# Patient Record
Sex: FEMALE | Race: WHITE | NOT HISPANIC OR LATINO | Employment: OTHER | URBAN - NONMETROPOLITAN AREA
[De-identification: names, ages, dates, MRNs, and addresses within clinical notes are randomized per-mention and may not be internally consistent; named-entity substitution may affect disease eponyms.]

---

## 2017-04-20 ENCOUNTER — TELEPHONE (OUTPATIENT)
Dept: GASTROENTEROLOGY | Age: 72
End: 2017-04-20

## 2017-05-05 ENCOUNTER — TELEPHONE (OUTPATIENT)
Dept: FAMILY MEDICINE | Age: 72
End: 2017-05-05

## 2017-05-16 ENCOUNTER — TELEPHONE (OUTPATIENT)
Dept: FAMILY MEDICINE | Age: 72
End: 2017-05-16

## 2017-05-17 ENCOUNTER — TELEPHONE (OUTPATIENT)
Dept: FAMILY MEDICINE | Age: 72
End: 2017-05-17

## 2017-05-18 ENCOUNTER — MED INFO FORMS (OUTPATIENT)
Dept: HEALTH INFORMATION MANAGEMENT | Age: 72
End: 2017-05-18

## 2017-05-22 RX ORDER — BISACODYL 5 MG/1
TABLET, DELAYED RELEASE ORAL
Qty: 4 TABLET | Refills: 0 | Status: ON HOLD | OUTPATIENT
Start: 2017-05-22 | End: 2017-06-06 | Stop reason: ALTCHOICE

## 2017-05-22 RX ORDER — POLYETHYLENE GLYCOL 3350, SODIUM CHLORIDE, SODIUM BICARBONATE, POTASSIUM CHLORIDE 420; 11.2; 5.72; 1.48 G/4L; G/4L; G/4L; G/4L
POWDER, FOR SOLUTION ORAL
Qty: 4000 ML | Refills: 0 | Status: ON HOLD | OUTPATIENT
Start: 2017-05-22 | End: 2017-06-06 | Stop reason: ALTCHOICE

## 2017-06-01 ENCOUNTER — MED INFO FORMS (OUTPATIENT)
Dept: HEALTH INFORMATION MANAGEMENT | Age: 72
End: 2017-06-01

## 2017-06-02 ASSESSMENT — ACTIVITIES OF DAILY LIVING (ADL)
CHRONIC_PAIN_PRESENT: NO
ADL_SHORT_OF_BREATH: NO
SENSORY_SUPPORT_DEVICES: EYEGLASSES;DENTURES
HISTORY OF FALLING IN THE LAST YEAR (PRIOR TO ADMISSION): NO
ADL_BEFORE_ADMISSION: INDEPENDENT
ADL_SCORE: 12
RECENT_DECLINE_ADL: NO
NEEDS_ASSIST: NO

## 2017-06-02 ASSESSMENT — COGNITIVE AND FUNCTIONAL STATUS - GENERAL
ARE YOU BLIND OR DO YOU HAVE SERIOUS DIFFICULTY SEEING, EVEN WHEN WEARING GLASSES: NO
ARE YOU DEAF OR DO YOU HAVE SERIOUS DIFFICULTY  HEARING: NO

## 2017-06-05 DIAGNOSIS — Z85.038 HISTORY OF COLON CANCER: Primary | ICD-10-CM

## 2017-06-05 RX ORDER — SODIUM CHLORIDE 9 MG/ML
INJECTION, SOLUTION INTRAVENOUS CONTINUOUS
Status: CANCELLED | OUTPATIENT
Start: 2017-06-05

## 2017-06-05 RX ORDER — 0.9 % SODIUM CHLORIDE 0.9 %
2 VIAL (ML) INJECTION EVERY 12 HOURS SCHEDULED
Status: CANCELLED | OUTPATIENT
Start: 2017-06-05

## 2017-06-05 RX ORDER — 0.9 % SODIUM CHLORIDE 0.9 %
2 VIAL (ML) INJECTION PRN
Status: CANCELLED | OUTPATIENT
Start: 2017-06-05

## 2017-06-06 ENCOUNTER — PREP FOR CASE (OUTPATIENT)
Dept: GASTROENTEROLOGY | Age: 72
End: 2017-06-06

## 2017-06-06 ENCOUNTER — SURGERY (OUTPATIENT)
Age: 72
End: 2017-06-06

## 2017-06-06 ENCOUNTER — HOSPITAL ENCOUNTER (OUTPATIENT)
Age: 72
Discharge: HOME | End: 2017-06-06
Attending: INTERNAL MEDICINE | Admitting: INTERNAL MEDICINE

## 2017-06-06 ENCOUNTER — TELEPHONE (OUTPATIENT)
Dept: GASTROENTEROLOGY | Age: 72
End: 2017-06-06

## 2017-06-06 VITALS
WEIGHT: 165 LBS | TEMPERATURE: 98.2 F | BODY MASS INDEX: 32.39 KG/M2 | HEIGHT: 60 IN | HEART RATE: 66 BPM | RESPIRATION RATE: 14 BRPM | OXYGEN SATURATION: 96 % | SYSTOLIC BLOOD PRESSURE: 149 MMHG | DIASTOLIC BLOOD PRESSURE: 58 MMHG

## 2017-06-06 DIAGNOSIS — Z85.038 HISTORY OF COLON CANCER: Primary | ICD-10-CM

## 2017-06-06 DIAGNOSIS — Z85.038 HISTORY OF COLON CANCER: ICD-10-CM

## 2017-06-06 DIAGNOSIS — Z93.3 COLOSTOMY IN PLACE (CMD): ICD-10-CM

## 2017-06-06 DIAGNOSIS — K57.30 DIVERTICULOSIS OF LARGE INTESTINE WITHOUT HEMORRHAGE: ICD-10-CM

## 2017-06-06 DIAGNOSIS — K63.5 POLYP OF DESCENDING COLON, UNSPECIFIED TYPE: ICD-10-CM

## 2017-06-06 PROCEDURE — 44394 COLONOSCOPY W/SNARE: CPT | Performed by: INTERNAL MEDICINE

## 2017-06-06 PROCEDURE — 10002800 HB RX 250 W HCPCS: Performed by: INTERNAL MEDICINE

## 2017-06-06 PROCEDURE — G0500 MOD SEDAT ENDO SERVICE >5YRS: HCPCS | Performed by: INTERNAL MEDICINE

## 2017-06-06 PROCEDURE — 10002807 HB RX 258: Performed by: INTERNAL MEDICINE

## 2017-06-06 PROCEDURE — 88305 TISSUE EXAM BY PATHOLOGIST: CPT

## 2017-06-06 PROCEDURE — 10003428 HB COLONOSCOPY: Performed by: INTERNAL MEDICINE

## 2017-06-06 PROCEDURE — 10003056 HB DISPOSABLE INSTRUMENT/SUPPLY 1: Performed by: INTERNAL MEDICINE

## 2017-06-06 RX ORDER — 0.9 % SODIUM CHLORIDE 0.9 %
2 VIAL (ML) INJECTION EVERY 12 HOURS SCHEDULED
Status: DISCONTINUED | OUTPATIENT
Start: 2017-06-06 | End: 2017-06-06 | Stop reason: HOSPADM

## 2017-06-06 RX ORDER — 0.9 % SODIUM CHLORIDE 0.9 %
2 VIAL (ML) INJECTION PRN
Status: DISCONTINUED | OUTPATIENT
Start: 2017-06-06 | End: 2017-06-06 | Stop reason: HOSPADM

## 2017-06-06 RX ORDER — SODIUM CHLORIDE 9 MG/ML
INJECTION, SOLUTION INTRAVENOUS CONTINUOUS
Status: DISCONTINUED | OUTPATIENT
Start: 2017-06-06 | End: 2017-06-06 | Stop reason: HOSPADM

## 2017-06-06 RX ORDER — MIDAZOLAM HYDROCHLORIDE 1 MG/ML
INJECTION, SOLUTION INTRAMUSCULAR; INTRAVENOUS PRN
Status: DISCONTINUED | OUTPATIENT
Start: 2017-06-06 | End: 2017-06-06 | Stop reason: HOSPADM

## 2017-06-06 RX ADMIN — SODIUM CHLORIDE: 9 INJECTION, SOLUTION INTRAVENOUS at 07:45

## 2017-06-06 RX ADMIN — FENTANYL CITRATE 50 MCG: 50 INJECTION INTRAMUSCULAR; INTRAVENOUS at 08:42

## 2017-06-06 RX ADMIN — MIDAZOLAM HYDROCHLORIDE 1 MG: 1 INJECTION, SOLUTION INTRAMUSCULAR; INTRAVENOUS at 08:43

## 2017-06-06 RX ADMIN — FENTANYL CITRATE 25 MCG: 50 INJECTION INTRAMUSCULAR; INTRAVENOUS at 08:46

## 2017-06-06 RX ADMIN — MIDAZOLAM HYDROCHLORIDE 1 MG: 1 INJECTION, SOLUTION INTRAMUSCULAR; INTRAVENOUS at 08:55

## 2017-06-06 RX ADMIN — MIDAZOLAM HYDROCHLORIDE 2 MG: 1 INJECTION, SOLUTION INTRAMUSCULAR; INTRAVENOUS at 08:42

## 2017-06-06 RX ADMIN — MIDAZOLAM HYDROCHLORIDE 1 MG: 1 INJECTION, SOLUTION INTRAMUSCULAR; INTRAVENOUS at 08:46

## 2017-06-06 RX ADMIN — FENTANYL CITRATE 25 MCG: 50 INJECTION INTRAMUSCULAR; INTRAVENOUS at 08:54

## 2017-06-06 ASSESSMENT — PAIN SCALES - GENERAL
PAIN_LEVEL_AT_REST: 0

## 2017-06-06 ASSESSMENT — LIFESTYLE VARIABLES: SMOKING_HISTORY: NO

## 2017-06-07 LAB — PATHOLOGIST NAME: NORMAL

## 2017-06-15 ENCOUNTER — TELEPHONE (OUTPATIENT)
Dept: GASTROENTEROLOGY | Age: 72
End: 2017-06-15

## 2017-07-31 ENCOUNTER — OFFICE VISIT (OUTPATIENT)
Dept: FAMILY MEDICINE | Age: 72
End: 2017-07-31

## 2017-07-31 VITALS
DIASTOLIC BLOOD PRESSURE: 68 MMHG | RESPIRATION RATE: 14 BRPM | HEIGHT: 60 IN | WEIGHT: 177 LBS | BODY MASS INDEX: 34.75 KG/M2 | SYSTOLIC BLOOD PRESSURE: 142 MMHG

## 2017-07-31 DIAGNOSIS — L91.8 SKIN TAG: ICD-10-CM

## 2017-07-31 DIAGNOSIS — L98.9 SKIN LESION OF SCALP: Primary | ICD-10-CM

## 2017-07-31 PROCEDURE — 11200 RMVL SKIN TAGS UP TO&INC 15: CPT | Performed by: FAMILY MEDICINE

## 2017-07-31 PROCEDURE — 11100 BIOPSY OF SKIN LESION: CPT | Performed by: FAMILY MEDICINE

## 2017-08-02 ENCOUNTER — NURSE ONLY (OUTPATIENT)
Dept: FAMILY MEDICINE | Age: 72
End: 2017-08-02

## 2017-08-02 VITALS
RESPIRATION RATE: 16 BRPM | DIASTOLIC BLOOD PRESSURE: 66 MMHG | HEART RATE: 60 BPM | SYSTOLIC BLOOD PRESSURE: 160 MMHG | OXYGEN SATURATION: 97 %

## 2017-08-02 DIAGNOSIS — Z51.81 MEDICATION MONITORING ENCOUNTER: Primary | ICD-10-CM

## 2017-08-02 LAB — PATHOLOGIST NAME: NORMAL

## 2017-08-02 RX ORDER — HYDROCHLOROTHIAZIDE 12.5 MG/1
12.5 CAPSULE, GELATIN COATED ORAL DAILY
Qty: 30 CAPSULE | Refills: 1 | Status: SHIPPED | OUTPATIENT
Start: 2017-08-02 | End: 2017-09-07 | Stop reason: SDUPTHER

## 2017-08-03 ENCOUNTER — TELEPHONE (OUTPATIENT)
Dept: FAMILY MEDICINE | Age: 72
End: 2017-08-03

## 2017-08-03 DIAGNOSIS — C44.92 SCC (SQUAMOUS CELL CARCINOMA): Primary | ICD-10-CM

## 2017-08-09 ENCOUNTER — OFFICE VISIT (OUTPATIENT)
Dept: DERMATOLOGY | Age: 72
End: 2017-08-09

## 2017-08-09 VITALS — DIASTOLIC BLOOD PRESSURE: 82 MMHG | SYSTOLIC BLOOD PRESSURE: 140 MMHG | HEART RATE: 64 BPM | RESPIRATION RATE: 16 BRPM

## 2017-08-09 DIAGNOSIS — C44.42 SCC (SQUAMOUS CELL CARCINOMA), SCALP/NECK: Primary | ICD-10-CM

## 2017-08-09 PROCEDURE — 17312 MOHS ADDL STAGE: CPT | Performed by: DERMATOLOGY

## 2017-08-09 PROCEDURE — 17311 MOHS 1 STAGE H/N/HF/G: CPT | Performed by: DERMATOLOGY

## 2017-09-06 ENCOUNTER — OFFICE VISIT (OUTPATIENT)
Dept: DERMATOLOGY | Age: 72
End: 2017-09-06

## 2017-09-06 VITALS — HEART RATE: 82 BPM | DIASTOLIC BLOOD PRESSURE: 82 MMHG | RESPIRATION RATE: 18 BRPM | SYSTOLIC BLOOD PRESSURE: 128 MMHG

## 2017-09-06 DIAGNOSIS — C44.42 SCC (SQUAMOUS CELL CARCINOMA), SCALP/NECK: Primary | ICD-10-CM

## 2017-09-06 PROCEDURE — 99212 OFFICE O/P EST SF 10 MIN: CPT | Performed by: DERMATOLOGY

## 2017-09-07 ENCOUNTER — TELEPHONE (OUTPATIENT)
Dept: FAMILY MEDICINE | Age: 72
End: 2017-09-07

## 2017-09-07 RX ORDER — HYDROCHLOROTHIAZIDE 12.5 MG/1
12.5 CAPSULE, GELATIN COATED ORAL DAILY
Qty: 30 CAPSULE | Refills: 6 | Status: SHIPPED | OUTPATIENT
Start: 2017-09-07 | End: 2018-02-22 | Stop reason: SDUPTHER

## 2017-10-19 ENCOUNTER — TELEPHONE (OUTPATIENT)
Dept: FAMILY MEDICINE | Age: 72
End: 2017-10-19

## 2017-10-19 DIAGNOSIS — Z93.3 COLOSTOMY IN PLACE (CMD): Primary | ICD-10-CM

## 2017-12-07 ENCOUNTER — LAB SERVICES (OUTPATIENT)
Dept: LAB | Age: 72
End: 2017-12-07

## 2017-12-07 ENCOUNTER — IMAGING SERVICES (OUTPATIENT)
Dept: GENERAL RADIOLOGY | Age: 72
End: 2017-12-07
Attending: FAMILY MEDICINE

## 2017-12-07 ENCOUNTER — TELEPHONE (OUTPATIENT)
Dept: FAMILY MEDICINE | Age: 72
End: 2017-12-07

## 2017-12-07 ENCOUNTER — OFFICE VISIT (OUTPATIENT)
Dept: FAMILY MEDICINE | Age: 72
End: 2017-12-07

## 2017-12-07 VITALS
SYSTOLIC BLOOD PRESSURE: 132 MMHG | WEIGHT: 175.9 LBS | BODY MASS INDEX: 33.21 KG/M2 | RESPIRATION RATE: 16 BRPM | HEIGHT: 61 IN | HEART RATE: 64 BPM | DIASTOLIC BLOOD PRESSURE: 68 MMHG

## 2017-12-07 DIAGNOSIS — Z85.828 HISTORY OF MOHS SURGERY FOR SQUAMOUS CELL CARCINOMA OF SKIN: ICD-10-CM

## 2017-12-07 DIAGNOSIS — Z11.59 NEED FOR HEPATITIS C SCREENING TEST: ICD-10-CM

## 2017-12-07 DIAGNOSIS — M54.9 OTHER ACUTE BACK PAIN: ICD-10-CM

## 2017-12-07 DIAGNOSIS — R93.1 AGATSTON CORONARY ARTERY CALCIUM SCORE LESS THAN 100: ICD-10-CM

## 2017-12-07 DIAGNOSIS — K58.0 IRRITABLE BOWEL SYNDROME WITH DIARRHEA: ICD-10-CM

## 2017-12-07 DIAGNOSIS — K21.9 GASTROESOPHAGEAL REFLUX DISEASE, ESOPHAGITIS PRESENCE NOT SPECIFIED: ICD-10-CM

## 2017-12-07 DIAGNOSIS — E78.49 OTHER HYPERLIPIDEMIA: ICD-10-CM

## 2017-12-07 DIAGNOSIS — Z85.038 HISTORY OF COLON CANCER: ICD-10-CM

## 2017-12-07 DIAGNOSIS — Z00.00 MEDICARE ANNUAL WELLNESS VISIT, SUBSEQUENT: Primary | ICD-10-CM

## 2017-12-07 DIAGNOSIS — I10 ESSENTIAL HYPERTENSION: ICD-10-CM

## 2017-12-07 DIAGNOSIS — Z98.890 HISTORY OF MOHS SURGERY FOR SQUAMOUS CELL CARCINOMA OF SKIN: ICD-10-CM

## 2017-12-07 DIAGNOSIS — Z79.899 MEDICATION MANAGEMENT: ICD-10-CM

## 2017-12-07 DIAGNOSIS — Z86.59 HISTORY OF DEPRESSION: ICD-10-CM

## 2017-12-07 DIAGNOSIS — M51.36 DEGENERATIVE DISC DISEASE, LUMBAR: ICD-10-CM

## 2017-12-07 DIAGNOSIS — Z23 NEED FOR VACCINATION: ICD-10-CM

## 2017-12-07 DIAGNOSIS — R82.90 ABNORMAL URINE: ICD-10-CM

## 2017-12-07 DIAGNOSIS — M47.816 LUMBAR SPONDYLOSIS: ICD-10-CM

## 2017-12-07 DIAGNOSIS — Z93.3 COLOSTOMY IN PLACE (CMD): ICD-10-CM

## 2017-12-07 DIAGNOSIS — H43.392 VITREOUS FLOATERS OF LEFT EYE: ICD-10-CM

## 2017-12-07 LAB
ALBUMIN SERPL-MCNC: 3.5 G/DL (ref 3.6–5.1)
ALBUMIN/GLOB SERPL: 1.1 {RATIO} (ref 1–2.4)
ALP SERPL-CCNC: 74 UNITS/L (ref 45–117)
ALT SERPL-CCNC: 28 UNITS/L
ANION GAP SERPL CALC-SCNC: 14 MMOL/L (ref 10–20)
APPEARANCE UR: CLEAR
APPEARANCE UR: CLEAR
AST SERPL-CCNC: 16 UNITS/L
BACTERIA #/AREA URNS HPF: ABNORMAL /HPF
BILIRUB SERPL-MCNC: 0.5 MG/DL (ref 0.2–1)
BILIRUB UR QL STRIP: NEGATIVE
BILIRUB UR QL STRIP: NEGATIVE
BUN SERPL-MCNC: 22 MG/DL (ref 6–20)
BUN/CREAT SERPL: 26 (ref 7–25)
CALCIUM SERPL-MCNC: 9 MG/DL (ref 8.4–10.2)
CHLORIDE SERPL-SCNC: 101 MMOL/L (ref 98–107)
CHOLEST SERPL-MCNC: 283 MG/DL
CHOLEST/HDLC SERPL: 3.6 {RATIO}
CO2 SERPL-SCNC: 27 MMOL/L (ref 21–32)
COLOR UR: YELLOW
COLOR UR: YELLOW
CREAT SERPL-MCNC: 0.84 MG/DL (ref 0.51–0.95)
FASTING STATUS PATIENT QL REPORTED: 12 HRS
GLOBULIN SER-MCNC: 3.2 G/DL (ref 2–4)
GLUCOSE SERPL-MCNC: 91 MG/DL (ref 65–99)
GLUCOSE UR STRIP-MCNC: NEGATIVE MG/DL
GLUCOSE UR STRIP-MCNC: NEGATIVE MG/DL
HCV AB SER QL: NEGATIVE
HDLC SERPL-MCNC: 79 MG/DL
HGB UR QL STRIP: NEGATIVE
HGB UR QL STRIP: NEGATIVE
HYALINE CASTS #/AREA URNS LPF: ABNORMAL /LPF (ref 0–5)
KETONES UR STRIP-MCNC: NEGATIVE MG/DL
KETONES UR STRIP-MCNC: NEGATIVE MG/DL
LDLC SERPL-MCNC: 173 MG/DL
LENGTH OF FAST TIME PATIENT: 12 HRS
LEUKOCYTE ESTERASE UR QL STRIP: ABNORMAL
LEUKOCYTE ESTERASE UR QL STRIP: ABNORMAL
NITRITE UR QL STRIP: NEGATIVE
NITRITE UR QL STRIP: NEGATIVE
NONHDLC SERPL-MCNC: 204 MG/DL
PH UR STRIP: 7 UNITS (ref 5–7)
PH UR STRIP: 7 UNITS (ref 5–7)
POTASSIUM SERPL-SCNC: 3.9 MMOL/L (ref 3.4–5.1)
PROT SERPL-MCNC: 6.7 G/DL (ref 6.4–8.2)
PROT UR STRIP-MCNC: NEGATIVE MG/DL
PROT UR STRIP-MCNC: NEGATIVE MG/DL
RBC #/AREA URNS HPF: ABNORMAL /HPF (ref 0–3)
SODIUM SERPL-SCNC: 138 MMOL/L (ref 135–145)
SP GR UR STRIP: 1.01 (ref 1–1.03)
SP GR UR STRIP: 1.01 (ref 1–1.03)
SPECIMEN SOURCE: ABNORMAL
SPECIMEN SOURCE: ABNORMAL
SQUAMOUS #/AREA URNS HPF: ABNORMAL /HPF (ref 0–5)
TRIGL SERPL-MCNC: 156 MG/DL
UROBILINOGEN UR STRIP-MCNC: 0.2 MG/DL (ref 0–1)
UROBILINOGEN UR STRIP-MCNC: 0.2 MG/DL (ref 0–1)
WBC #/AREA URNS HPF: ABNORMAL /HPF (ref 0–5)

## 2017-12-07 PROCEDURE — 72100 X-RAY EXAM L-S SPINE 2/3 VWS: CPT | Performed by: RADIOLOGY

## 2017-12-07 PROCEDURE — G0008 ADMIN INFLUENZA VIRUS VAC: HCPCS | Performed by: FAMILY MEDICINE

## 2017-12-07 PROCEDURE — 36415 COLL VENOUS BLD VENIPUNCTURE: CPT | Performed by: INTERNAL MEDICINE

## 2017-12-07 PROCEDURE — G0439 PPPS, SUBSEQ VISIT: HCPCS | Performed by: FAMILY MEDICINE

## 2017-12-07 PROCEDURE — 81003 URINALYSIS AUTO W/O SCOPE: CPT | Performed by: INTERNAL MEDICINE

## 2017-12-07 PROCEDURE — 99214 OFFICE O/P EST MOD 30 MIN: CPT | Performed by: FAMILY MEDICINE

## 2017-12-07 PROCEDURE — 90662 IIV NO PRSV INCREASED AG IM: CPT | Performed by: FAMILY MEDICINE

## 2017-12-07 RX ORDER — OMEPRAZOLE 20 MG/1
20 CAPSULE, DELAYED RELEASE ORAL DAILY
Qty: 90 CAPSULE | Refills: 3 | Status: SHIPPED | OUTPATIENT
Start: 2017-12-07 | End: 2018-02-19 | Stop reason: SDUPTHER

## 2017-12-07 RX ORDER — LOPERAMIDE HYDROCHLORIDE 2 MG/1
2 TABLET ORAL PRN
COMMUNITY
End: 2021-11-23 | Stop reason: ALTCHOICE

## 2017-12-07 RX ORDER — LOSARTAN POTASSIUM 100 MG/1
100 TABLET ORAL DAILY
Qty: 90 TABLET | Refills: 3 | Status: SHIPPED | OUTPATIENT
Start: 2017-12-07 | End: 2017-12-07 | Stop reason: SDUPTHER

## 2017-12-07 RX ORDER — DICYCLOMINE HCL 20 MG
TABLET ORAL
Qty: 30 TABLET | Refills: 0 | Status: SHIPPED | OUTPATIENT
Start: 2017-12-07 | End: 2020-09-17 | Stop reason: ALTCHOICE

## 2017-12-07 RX ORDER — LOSARTAN POTASSIUM 100 MG/1
100 TABLET ORAL DAILY
Qty: 90 TABLET | Refills: 3 | Status: SHIPPED | OUTPATIENT
Start: 2017-12-07 | End: 2018-02-22 | Stop reason: SDUPTHER

## 2017-12-07 ASSESSMENT — PATIENT HEALTH QUESTIONNAIRE - PHQ9
1. LITTLE INTEREST OR PLEASURE IN DOING THINGS: NO
2. FEELING DOWN, DEPRESSED OR HOPELESS: NO

## 2017-12-09 PROBLEM — Z98.890 HISTORY OF MOHS SURGERY FOR SQUAMOUS CELL CARCINOMA OF SKIN: Status: RESOLVED | Noted: 2017-12-09 | Resolved: 2017-12-09

## 2017-12-09 PROBLEM — M51.36 DEGENERATIVE DISC DISEASE, LUMBAR: Status: ACTIVE | Noted: 2017-12-09

## 2017-12-09 PROBLEM — H43.392 VITREOUS FLOATERS OF LEFT EYE: Status: ACTIVE | Noted: 2017-12-09

## 2017-12-09 PROBLEM — Z98.890 HISTORY OF MOHS SURGERY FOR SQUAMOUS CELL CARCINOMA OF SKIN: Status: ACTIVE | Noted: 2017-12-09

## 2017-12-09 PROBLEM — K58.0 IRRITABLE BOWEL SYNDROME WITH DIARRHEA: Status: ACTIVE | Noted: 2017-12-09

## 2017-12-09 PROBLEM — M51.369 DEGENERATIVE DISC DISEASE, LUMBAR: Status: ACTIVE | Noted: 2017-12-09

## 2017-12-09 PROBLEM — M47.816 LUMBAR SPONDYLOSIS: Status: ACTIVE | Noted: 2017-12-09

## 2017-12-09 PROBLEM — Z85.828 HISTORY OF MOHS SURGERY FOR SQUAMOUS CELL CARCINOMA OF SKIN: Status: ACTIVE | Noted: 2017-12-09

## 2017-12-09 PROBLEM — Z85.828 HISTORY OF MOHS SURGERY FOR SQUAMOUS CELL CARCINOMA OF SKIN: Status: RESOLVED | Noted: 2017-12-09 | Resolved: 2017-12-09

## 2018-01-08 ENCOUNTER — TELEPHONE (OUTPATIENT)
Dept: FAMILY MEDICINE | Age: 73
End: 2018-01-08

## 2018-01-09 ENCOUNTER — TELEPHONE (OUTPATIENT)
Dept: FAMILY MEDICINE | Age: 73
End: 2018-01-09

## 2018-01-09 DIAGNOSIS — M54.50 BILATERAL LOW BACK PAIN WITHOUT SCIATICA, UNSPECIFIED CHRONICITY: Primary | ICD-10-CM

## 2018-01-26 ENCOUNTER — TELEPHONE (OUTPATIENT)
Dept: FAMILY MEDICINE | Age: 73
End: 2018-01-26

## 2018-02-19 RX ORDER — HYDROCHLOROTHIAZIDE 12.5 MG/1
CAPSULE, GELATIN COATED ORAL
Qty: 90 CAPSULE | Refills: 6 | OUTPATIENT
Start: 2018-02-19

## 2018-02-19 RX ORDER — OMEPRAZOLE 20 MG/1
20 CAPSULE, DELAYED RELEASE ORAL DAILY
Qty: 90 CAPSULE | Refills: 3 | Status: SHIPPED | OUTPATIENT
Start: 2018-02-19 | End: 2018-02-22 | Stop reason: SDUPTHER

## 2018-02-21 ENCOUNTER — TELEPHONE (OUTPATIENT)
Dept: FAMILY MEDICINE | Age: 73
End: 2018-02-21

## 2018-02-22 RX ORDER — HYDROCHLOROTHIAZIDE 12.5 MG/1
12.5 CAPSULE, GELATIN COATED ORAL DAILY
Qty: 30 CAPSULE | Refills: 9 | Status: SHIPPED | OUTPATIENT
Start: 2018-02-22 | End: 2018-11-08 | Stop reason: SDUPTHER

## 2018-02-22 RX ORDER — LOSARTAN POTASSIUM 100 MG/1
100 TABLET ORAL DAILY
Qty: 90 TABLET | Refills: 2 | Status: SHIPPED | OUTPATIENT
Start: 2018-02-22 | End: 2018-11-08 | Stop reason: SDUPTHER

## 2018-02-22 RX ORDER — OMEPRAZOLE 20 MG/1
20 CAPSULE, DELAYED RELEASE ORAL DAILY
Qty: 90 CAPSULE | Refills: 2 | Status: SHIPPED | OUTPATIENT
Start: 2018-02-22 | End: 2018-11-08 | Stop reason: SDUPTHER

## 2018-04-18 ENCOUNTER — TELEPHONE (OUTPATIENT)
Dept: FAMILY MEDICINE | Age: 73
End: 2018-04-18

## 2018-04-23 ENCOUNTER — TELEPHONE (OUTPATIENT)
Dept: FAMILY MEDICINE | Age: 73
End: 2018-04-23

## 2018-04-23 ENCOUNTER — OFFICE VISIT (OUTPATIENT)
Dept: DERMATOLOGY | Age: 73
End: 2018-04-23

## 2018-04-23 VITALS — HEART RATE: 82 BPM | SYSTOLIC BLOOD PRESSURE: 120 MMHG | DIASTOLIC BLOOD PRESSURE: 76 MMHG | RESPIRATION RATE: 20 BRPM

## 2018-04-23 DIAGNOSIS — Z85.828 PERSONAL HISTORY OF OTHER MALIGNANT NEOPLASM OF SKIN: ICD-10-CM

## 2018-04-23 DIAGNOSIS — D48.5 NEOPLASM OF UNCERTAIN BEHAVIOR OF SKIN: Primary | ICD-10-CM

## 2018-04-23 DIAGNOSIS — L82.1 OTHER SEBORRHEIC KERATOSIS: ICD-10-CM

## 2018-04-23 PROCEDURE — 99213 OFFICE O/P EST LOW 20 MIN: CPT | Performed by: DERMATOLOGY

## 2018-04-23 PROCEDURE — 11310 SHAVE SKIN LESION 0.5 CM/<: CPT | Performed by: DERMATOLOGY

## 2018-04-25 ENCOUNTER — TELEPHONE (OUTPATIENT)
Dept: DERMATOLOGY | Age: 73
End: 2018-04-25

## 2018-04-25 ENCOUNTER — OFF PREMISE (OUTPATIENT)
Dept: OTHER | Age: 73
End: 2018-04-25

## 2018-04-25 LAB — PATHOLOGIST NAME: NORMAL

## 2018-05-31 ENCOUNTER — TELEPHONE (OUTPATIENT)
Dept: FAMILY MEDICINE | Age: 73
End: 2018-05-31

## 2018-05-31 DIAGNOSIS — Z12.39 SCREENING BREAST EXAMINATION: Primary | ICD-10-CM

## 2018-06-07 ENCOUNTER — MED INFO FORMS (OUTPATIENT)
Dept: OTHER | Age: 73
End: 2018-06-07

## 2018-06-13 ENCOUNTER — HOSPITAL ENCOUNTER (OUTPATIENT)
Dept: MAMMOGRAPHY | Age: 73
Discharge: HOME | End: 2018-06-13
Attending: FAMILY MEDICINE

## 2018-06-13 DIAGNOSIS — Z12.39 SCREENING BREAST EXAMINATION: ICD-10-CM

## 2018-06-13 PROCEDURE — 3342F MAMMO ASSESS BENGN DOCD: CPT | Performed by: RADIOLOGY

## 2018-06-13 PROCEDURE — 77063 BREAST TOMOSYNTHESIS BI: CPT | Performed by: RADIOLOGY

## 2018-06-13 PROCEDURE — 77063 BREAST TOMOSYNTHESIS BI: CPT

## 2018-06-13 PROCEDURE — 77067 SCR MAMMO BI INCL CAD: CPT | Performed by: RADIOLOGY

## 2018-06-22 ENCOUNTER — OFFICE VISIT (OUTPATIENT)
Dept: FAMILY MEDICINE | Age: 73
End: 2018-06-22

## 2018-06-22 ENCOUNTER — IMAGING SERVICES (OUTPATIENT)
Dept: GENERAL RADIOLOGY | Age: 73
End: 2018-06-22
Attending: FAMILY MEDICINE

## 2018-06-22 ENCOUNTER — TELEPHONE (OUTPATIENT)
Dept: FAMILY MEDICINE | Age: 73
End: 2018-06-22

## 2018-06-22 DIAGNOSIS — R05.9 COUGH: ICD-10-CM

## 2018-06-22 DIAGNOSIS — M79.10 MYALGIA: ICD-10-CM

## 2018-06-22 DIAGNOSIS — J06.9 VIRAL URI WITH COUGH: Primary | ICD-10-CM

## 2018-06-22 PROCEDURE — 71046 X-RAY EXAM CHEST 2 VIEWS: CPT | Performed by: RADIOLOGY

## 2018-06-22 PROCEDURE — 99213 OFFICE O/P EST LOW 20 MIN: CPT | Performed by: FAMILY MEDICINE

## 2018-06-23 VITALS
TEMPERATURE: 98.3 F | OXYGEN SATURATION: 97 % | SYSTOLIC BLOOD PRESSURE: 128 MMHG | RESPIRATION RATE: 16 BRPM | DIASTOLIC BLOOD PRESSURE: 80 MMHG | HEART RATE: 72 BPM | WEIGHT: 175.8 LBS | HEIGHT: 61 IN | BODY MASS INDEX: 33.19 KG/M2

## 2018-06-23 RX ORDER — PROMETHAZINE HYDROCHLORIDE AND CODEINE PHOSPHATE 6.25; 1 MG/5ML; MG/5ML
SYRUP ORAL
Qty: 120 ML | Refills: 0 | Status: SHIPPED
Start: 2018-06-23 | End: 2019-09-14 | Stop reason: ALTCHOICE

## 2018-08-13 ENCOUNTER — TELEPHONE (OUTPATIENT)
Dept: FAMILY MEDICINE | Age: 73
End: 2018-08-13

## 2018-08-13 ENCOUNTER — LAB SERVICES (OUTPATIENT)
Dept: LAB | Age: 73
End: 2018-08-13

## 2018-08-13 DIAGNOSIS — N34.3 DYSURIA-FREQUENCY SYNDROME: ICD-10-CM

## 2018-08-13 DIAGNOSIS — N39.0 ACUTE UTI: ICD-10-CM

## 2018-08-13 DIAGNOSIS — N34.3 DYSURIA-FREQUENCY SYNDROME: Primary | ICD-10-CM

## 2018-08-13 LAB
APPEARANCE UR: CLEAR
BILIRUB UR QL STRIP: NEGATIVE
COLOR UR: YELLOW
GLUCOSE UR STRIP-MCNC: NEGATIVE MG/DL
HGB UR QL STRIP: ABNORMAL
KETONES UR STRIP-MCNC: NEGATIVE MG/DL
LEUKOCYTE ESTERASE UR QL STRIP: ABNORMAL
NITRITE UR QL STRIP: NEGATIVE
PH UR STRIP: 6 UNITS (ref 5–7)
PROT UR STRIP-MCNC: NEGATIVE MG/DL
SP GR UR STRIP: 1.01 (ref 1–1.03)
SPECIMEN SOURCE: ABNORMAL
UROBILINOGEN UR STRIP-MCNC: 0.2 MG/DL (ref 0–1)

## 2018-08-13 PROCEDURE — 81003 URINALYSIS AUTO W/O SCOPE: CPT | Performed by: INTERNAL MEDICINE

## 2018-08-13 RX ORDER — SULFAMETHOXAZOLE AND TRIMETHOPRIM 800; 160 MG/1; MG/1
1 TABLET ORAL 2 TIMES DAILY
Qty: 6 TABLET | Refills: 0 | Status: SHIPPED | OUTPATIENT
Start: 2018-08-13 | End: 2018-08-16

## 2018-08-15 LAB
BACTERIA UR CULT: ABNORMAL
ORGANISM: ABNORMAL
REPORT STATUS (RPT): ABNORMAL
SPECIMEN SOURCE: ABNORMAL

## 2018-08-17 ENCOUNTER — TELEPHONE (OUTPATIENT)
Dept: FAMILY MEDICINE | Age: 73
End: 2018-08-17

## 2018-11-08 ENCOUNTER — TELEPHONE (OUTPATIENT)
Dept: FAMILY MEDICINE | Age: 73
End: 2018-11-08

## 2018-11-08 RX ORDER — OMEPRAZOLE 20 MG/1
20 CAPSULE, DELAYED RELEASE ORAL DAILY
Qty: 90 CAPSULE | Refills: 2 | Status: SHIPPED | OUTPATIENT
Start: 2018-11-08 | End: 2019-09-12 | Stop reason: SDUPTHER

## 2018-11-08 RX ORDER — LOSARTAN POTASSIUM 100 MG/1
100 TABLET ORAL DAILY
Qty: 90 TABLET | Refills: 2 | Status: SHIPPED | OUTPATIENT
Start: 2018-11-08 | End: 2019-09-12 | Stop reason: SDUPTHER

## 2018-11-08 RX ORDER — HYDROCHLOROTHIAZIDE 12.5 MG/1
12.5 CAPSULE, GELATIN COATED ORAL DAILY
Qty: 90 CAPSULE | Refills: 2 | Status: SHIPPED | OUTPATIENT
Start: 2018-11-08 | End: 2019-08-31 | Stop reason: SDUPTHER

## 2018-12-21 ENCOUNTER — TELEPHONE (OUTPATIENT)
Dept: FAMILY MEDICINE | Age: 73
End: 2018-12-21

## 2018-12-28 ENCOUNTER — TELEPHONE (OUTPATIENT)
Dept: FAMILY MEDICINE | Age: 73
End: 2018-12-28

## 2019-01-28 ENCOUNTER — TELEPHONE (OUTPATIENT)
Dept: FAMILY MEDICINE | Age: 74
End: 2019-01-28

## 2019-02-12 ENCOUNTER — TELEPHONE (OUTPATIENT)
Dept: FAMILY MEDICINE | Age: 74
End: 2019-02-12

## 2019-02-12 RX ORDER — LORAZEPAM 0.5 MG/1
0.5 TABLET ORAL AT BEDTIME
Qty: 30 TABLET | Refills: 0 | Status: SHIPPED | OUTPATIENT
Start: 2019-02-12 | End: 2019-09-14 | Stop reason: CLARIF

## 2019-03-25 ENCOUNTER — TELEPHONE (OUTPATIENT)
Dept: GASTROENTEROLOGY | Age: 74
End: 2019-03-25

## 2019-07-02 ENCOUNTER — OFFICE VISIT (OUTPATIENT)
Dept: FAMILY MEDICINE | Age: 74
End: 2019-07-02

## 2019-07-02 VITALS
OXYGEN SATURATION: 97 % | DIASTOLIC BLOOD PRESSURE: 82 MMHG | HEIGHT: 61 IN | WEIGHT: 173.7 LBS | HEART RATE: 61 BPM | BODY MASS INDEX: 32.8 KG/M2 | RESPIRATION RATE: 18 BRPM | SYSTOLIC BLOOD PRESSURE: 144 MMHG | TEMPERATURE: 95.9 F

## 2019-07-02 DIAGNOSIS — L72.3 INFECTED SEBACEOUS CYST: ICD-10-CM

## 2019-07-02 DIAGNOSIS — N61.1 BOIL, BREAST: Primary | ICD-10-CM

## 2019-07-02 DIAGNOSIS — L08.9 INFECTED SEBACEOUS CYST: ICD-10-CM

## 2019-07-02 PROCEDURE — 99213 OFFICE O/P EST LOW 20 MIN: CPT | Performed by: FAMILY MEDICINE

## 2019-07-02 PROCEDURE — 10060 I&D ABSCESS SIMPLE/SINGLE: CPT | Performed by: FAMILY MEDICINE

## 2019-07-02 RX ORDER — SULFAMETHOXAZOLE AND TRIMETHOPRIM 800; 160 MG/1; MG/1
1 TABLET ORAL 2 TIMES DAILY
Qty: 14 TABLET | Refills: 0 | Status: SHIPPED | OUTPATIENT
Start: 2019-07-02 | End: 2019-07-09

## 2019-07-02 ASSESSMENT — ENCOUNTER SYMPTOMS
FEVER: 0
EYES NEGATIVE: 1
HEMATOLOGIC/LYMPHATIC NEGATIVE: 1
NEUROLOGICAL NEGATIVE: 1
FATIGUE: 0
ACTIVITY CHANGE: 0
GASTROINTESTINAL NEGATIVE: 1
PSYCHIATRIC NEGATIVE: 1
CHILLS: 0
RESPIRATORY NEGATIVE: 1
DIAPHORESIS: 0
ENDOCRINE NEGATIVE: 1
CONSTITUTIONAL NEGATIVE: 1
ALLERGIC/IMMUNOLOGIC NEGATIVE: 1

## 2019-07-02 ASSESSMENT — PATIENT HEALTH QUESTIONNAIRE - PHQ9
1. LITTLE INTEREST OR PLEASURE IN DOING THINGS: NOT AT ALL
SUM OF ALL RESPONSES TO PHQ9 QUESTIONS 1 AND 2: 0
2. FEELING DOWN, DEPRESSED OR HOPELESS: NOT AT ALL
SUM OF ALL RESPONSES TO PHQ9 QUESTIONS 1 AND 2: 0

## 2019-09-03 RX ORDER — HYDROCHLOROTHIAZIDE 12.5 MG/1
12.5 CAPSULE, GELATIN COATED ORAL DAILY
Qty: 90 CAPSULE | Refills: 0 | Status: SHIPPED | OUTPATIENT
Start: 2019-09-03 | End: 2019-09-12 | Stop reason: SDUPTHER

## 2019-09-12 ENCOUNTER — TELEPHONE (OUTPATIENT)
Dept: GASTROENTEROLOGY | Age: 74
End: 2019-09-12

## 2019-09-12 ENCOUNTER — OFFICE VISIT (OUTPATIENT)
Dept: FAMILY MEDICINE | Age: 74
End: 2019-09-12

## 2019-09-12 DIAGNOSIS — M54.50 BILATERAL LOW BACK PAIN WITHOUT SCIATICA, UNSPECIFIED CHRONICITY: ICD-10-CM

## 2019-09-12 DIAGNOSIS — J02.9 SORE THROAT: ICD-10-CM

## 2019-09-12 DIAGNOSIS — I10 ESSENTIAL HYPERTENSION: ICD-10-CM

## 2019-09-12 DIAGNOSIS — K58.0 IRRITABLE BOWEL SYNDROME WITH DIARRHEA: ICD-10-CM

## 2019-09-12 DIAGNOSIS — K21.9 GASTROESOPHAGEAL REFLUX DISEASE, ESOPHAGITIS PRESENCE NOT SPECIFIED: ICD-10-CM

## 2019-09-12 DIAGNOSIS — Z93.3 COLOSTOMY IN PLACE (CMD): ICD-10-CM

## 2019-09-12 DIAGNOSIS — Z86.59 HISTORY OF DEPRESSION: ICD-10-CM

## 2019-09-12 DIAGNOSIS — M51.36 DEGENERATIVE DISC DISEASE, LUMBAR: ICD-10-CM

## 2019-09-12 DIAGNOSIS — M47.816 LUMBAR SPONDYLOSIS: ICD-10-CM

## 2019-09-12 DIAGNOSIS — Z85.038 HISTORY OF COLON CANCER: ICD-10-CM

## 2019-09-12 DIAGNOSIS — Z79.899 MEDICATION MANAGEMENT: ICD-10-CM

## 2019-09-12 DIAGNOSIS — H35.372 MACULAR PUCKER, LEFT EYE: ICD-10-CM

## 2019-09-12 DIAGNOSIS — E78.49 OTHER HYPERLIPIDEMIA: ICD-10-CM

## 2019-09-12 DIAGNOSIS — Z98.890 STATUS POST COLONOSCOPY: ICD-10-CM

## 2019-09-12 DIAGNOSIS — Z78.0 POST-MENOPAUSAL: ICD-10-CM

## 2019-09-12 DIAGNOSIS — Z00.00 MEDICARE ANNUAL WELLNESS VISIT, SUBSEQUENT: Primary | ICD-10-CM

## 2019-09-12 DIAGNOSIS — R93.1 AGATSTON CORONARY ARTERY CALCIUM SCORE LESS THAN 100: ICD-10-CM

## 2019-09-12 DIAGNOSIS — H54.62 VISION LOSS OF LEFT EYE: ICD-10-CM

## 2019-09-12 LAB
APPEARANCE UR: CLEAR
BILIRUB UR QL STRIP: NEGATIVE
COLOR UR: YELLOW
GLUCOSE UR STRIP-MCNC: NEGATIVE MG/DL
HGB UR QL STRIP: NEGATIVE
KETONES UR STRIP-MCNC: NEGATIVE MG/DL
LEUKOCYTE ESTERASE UR QL STRIP: ABNORMAL
NITRITE UR QL STRIP: NEGATIVE
PH UR STRIP: 7 UNITS (ref 5–7)
PROT UR STRIP-MCNC: NEGATIVE MG/DL
SP GR UR STRIP: 1.01 (ref 1–1.03)
SPECIMEN SOURCE: ABNORMAL
UROBILINOGEN UR STRIP-MCNC: 0.2 MG/DL (ref 0–1)

## 2019-09-12 PROCEDURE — 99214 OFFICE O/P EST MOD 30 MIN: CPT | Performed by: FAMILY MEDICINE

## 2019-09-12 PROCEDURE — G0439 PPPS, SUBSEQ VISIT: HCPCS | Performed by: FAMILY MEDICINE

## 2019-09-12 PROCEDURE — 81003 URINALYSIS AUTO W/O SCOPE: CPT | Performed by: INTERNAL MEDICINE

## 2019-09-12 RX ORDER — OMEPRAZOLE 20 MG/1
20 CAPSULE, DELAYED RELEASE ORAL DAILY
Qty: 90 CAPSULE | Refills: 3 | Status: SHIPPED | OUTPATIENT
Start: 2019-09-12 | End: 2020-10-05 | Stop reason: SDUPTHER

## 2019-09-12 RX ORDER — LOSARTAN POTASSIUM 100 MG/1
100 TABLET ORAL DAILY
Qty: 90 TABLET | Refills: 3 | Status: SHIPPED | OUTPATIENT
Start: 2019-09-12 | End: 2020-06-02 | Stop reason: SDUPTHER

## 2019-09-12 RX ORDER — HYDROCHLOROTHIAZIDE 12.5 MG/1
12.5 CAPSULE, GELATIN COATED ORAL DAILY
Qty: 90 CAPSULE | Refills: 3 | Status: SHIPPED | OUTPATIENT
Start: 2019-09-12 | End: 2020-08-31 | Stop reason: SDUPTHER

## 2019-09-12 ASSESSMENT — PAIN SCALES - GENERAL: PAINLEVEL: 0

## 2019-09-13 ENCOUNTER — LAB SERVICES (OUTPATIENT)
Dept: LAB | Age: 74
End: 2019-09-13

## 2019-09-13 DIAGNOSIS — E78.49 OTHER HYPERLIPIDEMIA: ICD-10-CM

## 2019-09-13 DIAGNOSIS — I10 ESSENTIAL HYPERTENSION: ICD-10-CM

## 2019-09-13 LAB
ALBUMIN SERPL-MCNC: 3.4 G/DL (ref 3.6–5.1)
ALBUMIN/GLOB SERPL: 1.2 {RATIO} (ref 1–2.4)
ALP SERPL-CCNC: 67 UNITS/L (ref 45–117)
ALT SERPL-CCNC: 20 UNITS/L
ANION GAP SERPL CALC-SCNC: 9 MMOL/L (ref 10–20)
AST SERPL-CCNC: 13 UNITS/L
BILIRUB SERPL-MCNC: 0.5 MG/DL (ref 0.2–1)
BUN SERPL-MCNC: 18 MG/DL (ref 6–20)
BUN/CREAT SERPL: 25 (ref 7–25)
CALCIUM SERPL-MCNC: 8.9 MG/DL (ref 8.4–10.2)
CHLORIDE SERPL-SCNC: 105 MMOL/L (ref 98–107)
CHOLEST SERPL-MCNC: 262 MG/DL
CHOLEST/HDLC SERPL: 4.1 {RATIO}
CO2 SERPL-SCNC: 29 MMOL/L (ref 21–32)
CREAT SERPL-MCNC: 0.71 MG/DL (ref 0.51–0.95)
FASTING STATUS PATIENT QL REPORTED: 12 HRS
GLOBULIN SER-MCNC: 2.8 G/DL (ref 2–4)
GLUCOSE SERPL-MCNC: 95 MG/DL (ref 65–99)
HDLC SERPL-MCNC: 64 MG/DL
LDLC SERPL-MCNC: 167 MG/DL
LENGTH OF FAST TIME PATIENT: 12 HRS
NONHDLC SERPL-MCNC: 198 MG/DL
POTASSIUM SERPL-SCNC: 4 MMOL/L (ref 3.4–5.1)
PROT SERPL-MCNC: 6.2 G/DL (ref 6.4–8.2)
SODIUM SERPL-SCNC: 139 MMOL/L (ref 135–145)
TRIGL SERPL-MCNC: 154 MG/DL

## 2019-09-13 PROCEDURE — 36415 COLL VENOUS BLD VENIPUNCTURE: CPT | Performed by: INTERNAL MEDICINE

## 2019-09-14 PROBLEM — H54.62 VISION LOSS OF LEFT EYE: Status: ACTIVE | Noted: 2019-09-14

## 2019-09-14 PROBLEM — H35.372 MACULAR PUCKER, LEFT EYE: Status: ACTIVE | Noted: 2019-09-14

## 2019-09-24 ENCOUNTER — TELEPHONE (OUTPATIENT)
Dept: FAMILY MEDICINE | Age: 74
End: 2019-09-24

## 2019-10-14 ENCOUNTER — TELEPHONE (OUTPATIENT)
Dept: GASTROENTEROLOGY | Age: 74
End: 2019-10-14

## 2019-11-14 ENCOUNTER — PREP FOR CASE (OUTPATIENT)
Dept: GASTROENTEROLOGY | Age: 74
End: 2019-11-14

## 2019-11-14 DIAGNOSIS — Z80.0 FAMILY HISTORY OF COLON CANCER IN FATHER: ICD-10-CM

## 2019-11-14 DIAGNOSIS — Z85.038 HISTORY OF COLON CANCER: Primary | ICD-10-CM

## 2020-06-02 RX ORDER — LOSARTAN POTASSIUM 100 MG/1
100 TABLET ORAL DAILY
Qty: 90 TABLET | Refills: 0 | Status: SHIPPED | OUTPATIENT
Start: 2020-06-02 | End: 2020-08-31 | Stop reason: SDUPTHER

## 2020-09-01 RX ORDER — HYDROCHLOROTHIAZIDE 12.5 MG/1
12.5 CAPSULE, GELATIN COATED ORAL DAILY
Qty: 90 CAPSULE | Refills: 0 | Status: SHIPPED | OUTPATIENT
Start: 2020-09-01 | End: 2020-09-17 | Stop reason: SDUPTHER

## 2020-09-01 RX ORDER — LOSARTAN POTASSIUM 100 MG/1
100 TABLET ORAL DAILY
Qty: 90 TABLET | Refills: 0 | Status: SHIPPED | OUTPATIENT
Start: 2020-09-01 | End: 2020-09-17 | Stop reason: SDUPTHER

## 2020-09-17 ENCOUNTER — LAB SERVICES (OUTPATIENT)
Dept: LAB | Age: 75
End: 2020-09-17

## 2020-09-17 ENCOUNTER — OFFICE VISIT (OUTPATIENT)
Dept: FAMILY MEDICINE | Age: 75
End: 2020-09-17

## 2020-09-17 ENCOUNTER — TELEPHONE (OUTPATIENT)
Dept: GASTROENTEROLOGY | Age: 75
End: 2020-09-17

## 2020-09-17 VITALS
HEIGHT: 61 IN | DIASTOLIC BLOOD PRESSURE: 78 MMHG | RESPIRATION RATE: 15 BRPM | BODY MASS INDEX: 32.83 KG/M2 | WEIGHT: 173.9 LBS | SYSTOLIC BLOOD PRESSURE: 118 MMHG | OXYGEN SATURATION: 99 % | HEART RATE: 56 BPM | TEMPERATURE: 97.9 F

## 2020-09-17 DIAGNOSIS — Z12.11 SCREEN FOR COLON CANCER: ICD-10-CM

## 2020-09-17 DIAGNOSIS — K21.9 GASTROESOPHAGEAL REFLUX DISEASE, ESOPHAGITIS PRESENCE NOT SPECIFIED: ICD-10-CM

## 2020-09-17 DIAGNOSIS — E78.49 OTHER HYPERLIPIDEMIA: ICD-10-CM

## 2020-09-17 DIAGNOSIS — R93.1 AGATSTON CORONARY ARTERY CALCIUM SCORE LESS THAN 100: ICD-10-CM

## 2020-09-17 DIAGNOSIS — Z85.038 HISTORY OF COLON CANCER: ICD-10-CM

## 2020-09-17 DIAGNOSIS — I10 ESSENTIAL HYPERTENSION: ICD-10-CM

## 2020-09-17 DIAGNOSIS — K58.0 IRRITABLE BOWEL SYNDROME WITH DIARRHEA: ICD-10-CM

## 2020-09-17 DIAGNOSIS — Z12.31 ENCOUNTER FOR SCREENING MAMMOGRAM FOR BREAST CANCER: ICD-10-CM

## 2020-09-17 DIAGNOSIS — L91.8 ACQUIRED SKIN TAG: ICD-10-CM

## 2020-09-17 DIAGNOSIS — Z00.00 MEDICARE ANNUAL WELLNESS VISIT, SUBSEQUENT: Primary | ICD-10-CM

## 2020-09-17 DIAGNOSIS — M51.36 DEGENERATIVE DISC DISEASE, LUMBAR: ICD-10-CM

## 2020-09-17 DIAGNOSIS — Z23 NEED FOR VACCINATION: ICD-10-CM

## 2020-09-17 DIAGNOSIS — Z93.3 COLOSTOMY IN PLACE (CMD): ICD-10-CM

## 2020-09-17 LAB
BASOPHILS # BLD: 0 K/MCL (ref 0–0.3)
BASOPHILS NFR BLD: 1 %
DEPRECATED RDW RBC: 43.3 FL (ref 39–50)
EOSINOPHIL # BLD: 0.2 K/MCL (ref 0.1–0.5)
EOSINOPHIL NFR BLD: 2 %
ERYTHROCYTE [DISTWIDTH] IN BLOOD: 13.4 % (ref 11–15)
HCT VFR BLD CALC: 41.4 % (ref 36–46.5)
HGB BLD-MCNC: 13.5 G/DL (ref 12–15.5)
IMM GRANULOCYTES # BLD AUTO: 0.1 K/MCL (ref 0–0.2)
IMM GRANULOCYTES # BLD: 1 %
LYMPHOCYTES # BLD: 1.7 K/MCL (ref 1–4)
LYMPHOCYTES NFR BLD: 25 %
MCH RBC QN AUTO: 28.9 PG (ref 26–34)
MCHC RBC AUTO-ENTMCNC: 32.6 G/DL (ref 32–36.5)
MCV RBC AUTO: 88.7 FL (ref 78–100)
MONOCYTES # BLD: 0.3 K/MCL (ref 0.3–0.9)
MONOCYTES NFR BLD: 4 %
NEUTROPHILS # BLD: 4.5 K/MCL (ref 1.8–7.7)
NEUTROPHILS NFR BLD: 67 %
NRBC BLD MANUAL-RTO: 0 /100 WBC
PLATELET # BLD AUTO: NORMAL 10*3/UL
RBC # BLD: 4.67 MIL/MCL (ref 4–5.2)
WBC # BLD: 6.7 K/MCL (ref 4.2–11)

## 2020-09-17 PROCEDURE — 36415 COLL VENOUS BLD VENIPUNCTURE: CPT | Performed by: INTERNAL MEDICINE

## 2020-09-17 PROCEDURE — 90662 IIV NO PRSV INCREASED AG IM: CPT | Performed by: FAMILY MEDICINE

## 2020-09-17 PROCEDURE — 84443 ASSAY THYROID STIM HORMONE: CPT | Performed by: CLINICAL MEDICAL LABORATORY

## 2020-09-17 PROCEDURE — 85025 COMPLETE CBC W/AUTO DIFF WBC: CPT | Performed by: CLINICAL MEDICAL LABORATORY

## 2020-09-17 PROCEDURE — G0439 PPPS, SUBSEQ VISIT: HCPCS | Performed by: FAMILY MEDICINE

## 2020-09-17 PROCEDURE — 80053 COMPREHEN METABOLIC PANEL: CPT | Performed by: CLINICAL MEDICAL LABORATORY

## 2020-09-17 PROCEDURE — G0008 ADMIN INFLUENZA VIRUS VAC: HCPCS | Performed by: FAMILY MEDICINE

## 2020-09-17 PROCEDURE — 11200 RMVL SKIN TAGS UP TO&INC 15: CPT | Performed by: FAMILY MEDICINE

## 2020-09-17 PROCEDURE — 80061 LIPID PANEL: CPT | Performed by: CLINICAL MEDICAL LABORATORY

## 2020-09-17 RX ORDER — ASPIRIN 81 MG/1
81 TABLET ORAL DAILY
Status: SHIPPED | COMMUNITY
Start: 2020-09-17

## 2020-09-17 RX ORDER — HYDROCHLOROTHIAZIDE 12.5 MG/1
12.5 CAPSULE, GELATIN COATED ORAL DAILY
Qty: 90 CAPSULE | Refills: 3 | Status: SHIPPED | OUTPATIENT
Start: 2020-09-17 | End: 2021-11-23 | Stop reason: SDUPTHER

## 2020-09-17 RX ORDER — LOSARTAN POTASSIUM 100 MG/1
100 TABLET ORAL DAILY
Qty: 90 TABLET | Refills: 3 | Status: SHIPPED | OUTPATIENT
Start: 2020-09-17 | End: 2021-11-23 | Stop reason: SDUPTHER

## 2020-09-17 RX ORDER — VIT A/VIT C/VIT E/ZINC/COPPER 2148-113
TABLET ORAL 2 TIMES DAILY
Status: SHIPPED | COMMUNITY
Start: 2020-09-17

## 2020-09-17 ASSESSMENT — PATIENT HEALTH QUESTIONNAIRE - PHQ9
2. FEELING DOWN, DEPRESSED OR HOPELESS: NOT AT ALL
CLINICAL INTERPRETATION OF PHQ2 SCORE: NO FURTHER SCREENING NEEDED
SUM OF ALL RESPONSES TO PHQ9 QUESTIONS 1 AND 2: 0
CLINICAL INTERPRETATION OF PHQ9 SCORE: NO FURTHER SCREENING NEEDED
SUM OF ALL RESPONSES TO PHQ9 QUESTIONS 1 AND 2: 0
1. LITTLE INTEREST OR PLEASURE IN DOING THINGS: NOT AT ALL

## 2020-09-18 ENCOUNTER — TELEPHONE (OUTPATIENT)
Dept: FAMILY MEDICINE | Age: 75
End: 2020-09-18

## 2020-09-18 LAB
ALBUMIN SERPL-MCNC: 3.3 G/DL (ref 3.6–5.1)
ALBUMIN/GLOB SERPL: 1.1 {RATIO} (ref 1–2.4)
ALP SERPL-CCNC: 62 UNITS/L (ref 45–117)
ALT SERPL-CCNC: 22 UNITS/L
ANION GAP SERPL CALC-SCNC: 13 MMOL/L (ref 10–20)
AST SERPL-CCNC: 12 UNITS/L
BILIRUB SERPL-MCNC: 0.5 MG/DL (ref 0.2–1)
BUN SERPL-MCNC: 17 MG/DL (ref 6–20)
BUN/CREAT SERPL: 20 (ref 7–25)
CALCIUM SERPL-MCNC: 8.7 MG/DL (ref 8.4–10.2)
CHLORIDE SERPL-SCNC: 103 MMOL/L (ref 98–107)
CHOLEST SERPL-MCNC: 258 MG/DL
CHOLEST/HDLC SERPL: 3.9 {RATIO}
CO2 SERPL-SCNC: 27 MMOL/L (ref 21–32)
CREAT SERPL-MCNC: 0.84 MG/DL (ref 0.51–0.95)
FASTING DURATION TIME PATIENT: 12 HOURS
FASTING DURATION TIME PATIENT: 12 HOURS
GFR SERPLBLD BASED ON 1.73 SQ M-ARVRAT: 69 ML/MIN/1.73M2
GLOBULIN SER-MCNC: 3 G/DL (ref 2–4)
GLUCOSE SERPL-MCNC: 95 MG/DL (ref 65–99)
HDLC SERPL-MCNC: 66 MG/DL
LDLC SERPL CALC-MCNC: 159 MG/DL
NONHDLC SERPL-MCNC: 192 MG/DL
POTASSIUM SERPL-SCNC: 3.4 MMOL/L (ref 3.4–5.1)
PROT SERPL-MCNC: 6.3 G/DL (ref 6.4–8.2)
SODIUM SERPL-SCNC: 140 MMOL/L (ref 135–145)
TRIGL SERPL-MCNC: 163 MG/DL
TSH SERPL-ACNC: 1 MCUNITS/ML (ref 0.35–5)

## 2020-09-30 ENCOUNTER — TELEPHONE (OUTPATIENT)
Dept: FAMILY MEDICINE | Age: 75
End: 2020-09-30

## 2020-10-05 ENCOUNTER — TELEPHONE (OUTPATIENT)
Dept: FAMILY MEDICINE | Age: 75
End: 2020-10-05

## 2020-10-05 RX ORDER — OMEPRAZOLE 20 MG/1
20 CAPSULE, DELAYED RELEASE ORAL DAILY
Qty: 90 CAPSULE | Refills: 3 | Status: SHIPPED | OUTPATIENT
Start: 2020-10-05 | End: 2021-09-30

## 2020-10-08 ENCOUNTER — TELEPHONE (OUTPATIENT)
Dept: GASTROENTEROLOGY | Age: 75
End: 2020-10-08

## 2020-10-08 ENCOUNTER — OFFICE VISIT (OUTPATIENT)
Dept: FAMILY MEDICINE | Age: 75
End: 2020-10-08

## 2020-10-08 VITALS
BODY MASS INDEX: 32.74 KG/M2 | TEMPERATURE: 97.3 F | OXYGEN SATURATION: 96 % | RESPIRATION RATE: 18 BRPM | WEIGHT: 173.4 LBS | HEIGHT: 61 IN | SYSTOLIC BLOOD PRESSURE: 138 MMHG | HEART RATE: 51 BPM | DIASTOLIC BLOOD PRESSURE: 84 MMHG

## 2020-10-08 DIAGNOSIS — L91.8 SKIN TAG: ICD-10-CM

## 2020-10-08 DIAGNOSIS — Z12.11 SPECIAL SCREENING FOR MALIGNANT NEOPLASM OF COLON: ICD-10-CM

## 2020-10-08 DIAGNOSIS — L82.0 SEBORRHEIC KERATOSIS, INFLAMED: Primary | ICD-10-CM

## 2020-10-08 DIAGNOSIS — I10 ESSENTIAL HYPERTENSION: ICD-10-CM

## 2020-10-08 PROCEDURE — 11311 SHAVE SKIN LESION 0.6-1.0 CM: CPT | Performed by: FAMILY MEDICINE

## 2020-10-08 PROCEDURE — 11200 RMVL SKIN TAGS UP TO&INC 15: CPT | Performed by: FAMILY MEDICINE

## 2020-10-08 PROCEDURE — 11312 SHAVE SKIN LESION 1.1-2.0 CM: CPT | Performed by: FAMILY MEDICINE

## 2020-10-08 PROCEDURE — 99212 OFFICE O/P EST SF 10 MIN: CPT | Performed by: FAMILY MEDICINE

## 2020-10-08 ASSESSMENT — ENCOUNTER SYMPTOMS
HEMATOLOGIC/LYMPHATIC NEGATIVE: 1
ENDOCRINE NEGATIVE: 1
DIAPHORESIS: 0
FEVER: 0
NEUROLOGICAL NEGATIVE: 1
PSYCHIATRIC NEGATIVE: 1
CHILLS: 0
RESPIRATORY NEGATIVE: 1
GASTROINTESTINAL NEGATIVE: 1
ACTIVITY CHANGE: 0
ALLERGIC/IMMUNOLOGIC NEGATIVE: 1
EYES NEGATIVE: 1

## 2020-10-08 ASSESSMENT — PATIENT HEALTH QUESTIONNAIRE - PHQ9
2. FEELING DOWN, DEPRESSED OR HOPELESS: NOT AT ALL
1. LITTLE INTEREST OR PLEASURE IN DOING THINGS: NOT AT ALL
CLINICAL INTERPRETATION OF PHQ2 SCORE: NO FURTHER SCREENING NEEDED
SUM OF ALL RESPONSES TO PHQ9 QUESTIONS 1 AND 2: 0
SUM OF ALL RESPONSES TO PHQ9 QUESTIONS 1 AND 2: 0
CLINICAL INTERPRETATION OF PHQ9 SCORE: NO FURTHER SCREENING NEEDED

## 2020-10-23 ENCOUNTER — TELEPHONE (OUTPATIENT)
Dept: SCHEDULING | Age: 75
End: 2020-10-23

## 2020-11-23 ENCOUNTER — NURSE TRIAGE (OUTPATIENT)
Dept: FAMILY MEDICINE | Age: 75
End: 2020-11-23

## 2020-12-03 ENCOUNTER — TELEPHONE (OUTPATIENT)
Dept: FAMILY MEDICINE | Age: 75
End: 2020-12-03

## 2021-01-01 ENCOUNTER — EXTERNAL RECORD (OUTPATIENT)
Dept: OTHER | Age: 76
End: 2021-01-01

## 2021-03-10 DIAGNOSIS — Z23 NEED FOR VACCINATION: ICD-10-CM

## 2021-03-19 ENCOUNTER — TELEPHONE (OUTPATIENT)
Dept: FAMILY MEDICINE | Age: 76
End: 2021-03-19

## 2021-04-07 VITALS
WEIGHT: 176 LBS | TEMPERATURE: 98.2 F | DIASTOLIC BLOOD PRESSURE: 74 MMHG | HEART RATE: 60 BPM | SYSTOLIC BLOOD PRESSURE: 128 MMHG | BODY MASS INDEX: 34.55 KG/M2 | RESPIRATION RATE: 12 BRPM | HEIGHT: 60 IN

## 2021-05-13 ENCOUNTER — OFFICE VISIT (OUTPATIENT)
Dept: FAMILY MEDICINE | Age: 76
End: 2021-05-13

## 2021-05-13 ENCOUNTER — TELEPHONE (OUTPATIENT)
Dept: FAMILY MEDICINE | Age: 76
End: 2021-05-13

## 2021-05-13 DIAGNOSIS — M54.50 CHRONIC MIDLINE LOW BACK PAIN WITHOUT SCIATICA: ICD-10-CM

## 2021-05-13 DIAGNOSIS — G89.29 CHRONIC MIDLINE LOW BACK PAIN WITHOUT SCIATICA: ICD-10-CM

## 2021-05-13 DIAGNOSIS — Z12.31 ENCOUNTER FOR SCREENING MAMMOGRAM FOR BREAST CANCER: Primary | ICD-10-CM

## 2021-05-13 DIAGNOSIS — Z93.3 COLOSTOMY IN PLACE (CMD): ICD-10-CM

## 2021-05-13 DIAGNOSIS — M47.816 LUMBAR FACET ARTHROPATHY: ICD-10-CM

## 2021-05-13 PROCEDURE — 99213 OFFICE O/P EST LOW 20 MIN: CPT | Performed by: FAMILY MEDICINE

## 2021-05-13 ASSESSMENT — ENCOUNTER SYMPTOMS
NEUROLOGICAL NEGATIVE: 1
BACK PAIN: 1
CHILLS: 0
HEMATOLOGIC/LYMPHATIC NEGATIVE: 1
ENDOCRINE NEGATIVE: 1
EYES NEGATIVE: 1
GASTROINTESTINAL NEGATIVE: 1
FEVER: 0
DIAPHORESIS: 0
ALLERGIC/IMMUNOLOGIC NEGATIVE: 1
PSYCHIATRIC NEGATIVE: 1
RESPIRATORY NEGATIVE: 1
ACTIVITY CHANGE: 0

## 2021-05-13 ASSESSMENT — PATIENT HEALTH QUESTIONNAIRE - PHQ9
SUM OF ALL RESPONSES TO PHQ9 QUESTIONS 1 AND 2: 1
CLINICAL INTERPRETATION OF PHQ2 SCORE: NO FURTHER SCREENING NEEDED
CLINICAL INTERPRETATION OF PHQ9 SCORE: NO FURTHER SCREENING NEEDED
2. FEELING DOWN, DEPRESSED OR HOPELESS: SEVERAL DAYS
SUM OF ALL RESPONSES TO PHQ9 QUESTIONS 1 AND 2: 1
1. LITTLE INTEREST OR PLEASURE IN DOING THINGS: NOT AT ALL

## 2021-05-13 ASSESSMENT — PAIN SCALES - GENERAL: PAINLEVEL: 1-2

## 2021-05-18 ENCOUNTER — TELEPHONE (OUTPATIENT)
Dept: SCHEDULING | Age: 76
End: 2021-05-18

## 2021-05-20 VITALS
DIASTOLIC BLOOD PRESSURE: 72 MMHG | HEART RATE: 63 BPM | SYSTOLIC BLOOD PRESSURE: 136 MMHG | WEIGHT: 170 LBS | RESPIRATION RATE: 12 BRPM | TEMPERATURE: 96.7 F | HEIGHT: 60 IN | BODY MASS INDEX: 33.38 KG/M2 | OXYGEN SATURATION: 99 %

## 2021-09-30 RX ORDER — OMEPRAZOLE 20 MG/1
20 CAPSULE, DELAYED RELEASE ORAL DAILY
Qty: 90 CAPSULE | Refills: 1 | Status: SHIPPED | OUTPATIENT
Start: 2021-09-30 | End: 2022-04-06 | Stop reason: SDUPTHER

## 2021-11-03 ENCOUNTER — HOSPITAL ENCOUNTER (OUTPATIENT)
Dept: MAMMOGRAPHY | Age: 76
Discharge: HOME | End: 2021-11-03
Attending: FAMILY MEDICINE

## 2021-11-03 DIAGNOSIS — Z12.31 ENCOUNTER FOR SCREENING MAMMOGRAM FOR BREAST CANCER: ICD-10-CM

## 2021-11-03 PROCEDURE — 77067 SCR MAMMO BI INCL CAD: CPT | Performed by: RADIOLOGY

## 2021-11-03 PROCEDURE — 77063 BREAST TOMOSYNTHESIS BI: CPT

## 2021-11-03 PROCEDURE — 77063 BREAST TOMOSYNTHESIS BI: CPT | Performed by: RADIOLOGY

## 2021-11-15 ENCOUNTER — TELEPHONE (OUTPATIENT)
Dept: FAMILY MEDICINE | Age: 76
End: 2021-11-15

## 2021-11-23 ENCOUNTER — LAB SERVICES (OUTPATIENT)
Dept: LAB | Age: 76
End: 2021-11-23

## 2021-11-23 ENCOUNTER — OFFICE VISIT (OUTPATIENT)
Dept: FAMILY MEDICINE | Age: 76
End: 2021-11-23

## 2021-11-23 VITALS
HEART RATE: 65 BPM | DIASTOLIC BLOOD PRESSURE: 60 MMHG | SYSTOLIC BLOOD PRESSURE: 124 MMHG | TEMPERATURE: 97.7 F | WEIGHT: 168.3 LBS | HEIGHT: 60 IN | OXYGEN SATURATION: 98 % | RESPIRATION RATE: 18 BRPM | BODY MASS INDEX: 33.04 KG/M2

## 2021-11-23 DIAGNOSIS — Z85.038 HISTORY OF COLON CANCER: ICD-10-CM

## 2021-11-23 DIAGNOSIS — Z28.21 REFUSED INFLUENZA VACCINE: ICD-10-CM

## 2021-11-23 DIAGNOSIS — E78.49 OTHER HYPERLIPIDEMIA: ICD-10-CM

## 2021-11-23 DIAGNOSIS — M51.36 DEGENERATIVE DISC DISEASE, LUMBAR: ICD-10-CM

## 2021-11-23 DIAGNOSIS — Z00.00 MEDICARE ANNUAL WELLNESS VISIT, SUBSEQUENT: Primary | ICD-10-CM

## 2021-11-23 DIAGNOSIS — E66.09 CLASS 1 OBESITY DUE TO EXCESS CALORIES WITH SERIOUS COMORBIDITY AND BODY MASS INDEX (BMI) OF 32.0 TO 32.9 IN ADULT: ICD-10-CM

## 2021-11-23 DIAGNOSIS — I10 ESSENTIAL HYPERTENSION: ICD-10-CM

## 2021-11-23 DIAGNOSIS — Z93.3 COLOSTOMY IN PLACE (CMD): ICD-10-CM

## 2021-11-23 LAB
BASOPHILS # BLD: 0 K/MCL (ref 0–0.3)
BASOPHILS NFR BLD: 1 %
DEPRECATED RDW RBC: 44.3 FL (ref 39–50)
EOSINOPHIL # BLD: 0.1 K/MCL (ref 0–0.5)
EOSINOPHIL NFR BLD: 1 %
ERYTHROCYTE [DISTWIDTH] IN BLOOD: 13.2 % (ref 11–15)
HCT VFR BLD CALC: 39.7 % (ref 36–46.5)
HGB BLD-MCNC: 12.7 G/DL (ref 12–15.5)
IMM GRANULOCYTES # BLD AUTO: 0 K/MCL (ref 0–0.2)
IMM GRANULOCYTES # BLD: 1 %
LYMPHOCYTES # BLD: 1.7 K/MCL (ref 1–4)
LYMPHOCYTES NFR BLD: 24 %
MCH RBC QN AUTO: 29.2 PG (ref 26–34)
MCHC RBC AUTO-ENTMCNC: 32 G/DL (ref 32–36.5)
MCV RBC AUTO: 91.3 FL (ref 78–100)
MONOCYTES # BLD: 0.4 K/MCL (ref 0.3–0.9)
MONOCYTES NFR BLD: 6 %
NEUTROPHILS # BLD: 4.7 K/MCL (ref 1.8–7.7)
NEUTROPHILS NFR BLD: 67 %
NRBC BLD MANUAL-RTO: 0 /100 WBC
PLATELET # BLD AUTO: 302 K/MCL (ref 140–450)
RBC # BLD: 4.35 MIL/MCL (ref 4–5.2)
TSH SERPL-ACNC: 0.86 MCUNITS/ML (ref 0.35–5)
WBC # BLD: 7 K/MCL (ref 4.2–11)

## 2021-11-23 PROCEDURE — 85025 COMPLETE CBC W/AUTO DIFF WBC: CPT | Performed by: CLINICAL MEDICAL LABORATORY

## 2021-11-23 PROCEDURE — 36415 COLL VENOUS BLD VENIPUNCTURE: CPT | Performed by: INTERNAL MEDICINE

## 2021-11-23 PROCEDURE — 80053 COMPREHEN METABOLIC PANEL: CPT | Performed by: CLINICAL MEDICAL LABORATORY

## 2021-11-23 PROCEDURE — 82378 CARCINOEMBRYONIC ANTIGEN: CPT | Performed by: CLINICAL MEDICAL LABORATORY

## 2021-11-23 PROCEDURE — G0439 PPPS, SUBSEQ VISIT: HCPCS | Performed by: FAMILY MEDICINE

## 2021-11-23 PROCEDURE — 84443 ASSAY THYROID STIM HORMONE: CPT | Performed by: CLINICAL MEDICAL LABORATORY

## 2021-11-23 PROCEDURE — 80061 LIPID PANEL: CPT | Performed by: CLINICAL MEDICAL LABORATORY

## 2021-11-23 RX ORDER — LOSARTAN POTASSIUM 100 MG/1
100 TABLET ORAL DAILY
Qty: 90 TABLET | Refills: 3 | Status: SHIPPED | OUTPATIENT
Start: 2021-11-23 | End: 2022-12-19

## 2021-11-23 RX ORDER — HYDROCHLOROTHIAZIDE 12.5 MG/1
12.5 CAPSULE, GELATIN COATED ORAL DAILY
Qty: 90 CAPSULE | Refills: 3 | Status: SHIPPED | OUTPATIENT
Start: 2021-11-23 | End: 2022-12-01 | Stop reason: SDUPTHER

## 2021-11-23 ASSESSMENT — PATIENT HEALTH QUESTIONNAIRE - PHQ9
SUM OF ALL RESPONSES TO PHQ9 QUESTIONS 1 AND 2: 0
CLINICAL INTERPRETATION OF PHQ2 SCORE: NO FURTHER SCREENING NEEDED
1. LITTLE INTEREST OR PLEASURE IN DOING THINGS: NOT AT ALL
SUM OF ALL RESPONSES TO PHQ9 QUESTIONS 1 AND 2: 0
2. FEELING DOWN, DEPRESSED OR HOPELESS: NOT AT ALL

## 2021-11-23 ASSESSMENT — ENCOUNTER SYMPTOMS
ENDOCRINE NEGATIVE: 1
DIAPHORESIS: 0
EYES NEGATIVE: 1
ACTIVITY CHANGE: 0
RESPIRATORY NEGATIVE: 1
NEUROLOGICAL NEGATIVE: 1
PSYCHIATRIC NEGATIVE: 1
BACK PAIN: 1
GASTROINTESTINAL NEGATIVE: 1
FEVER: 0
HEMATOLOGIC/LYMPHATIC NEGATIVE: 1
ALLERGIC/IMMUNOLOGIC NEGATIVE: 1
CHILLS: 0

## 2021-11-24 ENCOUNTER — TELEPHONE (OUTPATIENT)
Dept: FAMILY MEDICINE | Age: 76
End: 2021-11-24

## 2021-11-24 LAB
ALBUMIN SERPL-MCNC: 3.2 G/DL (ref 3.6–5.1)
ALBUMIN/GLOB SERPL: 1.1 {RATIO} (ref 1–2.4)
ALP SERPL-CCNC: 64 UNITS/L (ref 45–117)
ALT SERPL-CCNC: 19 UNITS/L
ANION GAP SERPL CALC-SCNC: 12 MMOL/L (ref 10–20)
AST SERPL-CCNC: 12 UNITS/L
BILIRUB SERPL-MCNC: 0.2 MG/DL (ref 0.2–1)
BUN SERPL-MCNC: 21 MG/DL (ref 6–20)
BUN/CREAT SERPL: 27 (ref 7–25)
CALCIUM SERPL-MCNC: 8.9 MG/DL (ref 8.4–10.2)
CEA SERPL-MCNC: 0.7 NG/ML (ref 0–5)
CHLORIDE SERPL-SCNC: 105 MMOL/L (ref 98–107)
CHOLEST SERPL-MCNC: 228 MG/DL
CHOLEST/HDLC SERPL: 3.9 {RATIO}
CO2 SERPL-SCNC: 29 MMOL/L (ref 21–32)
CREAT SERPL-MCNC: 0.78 MG/DL (ref 0.51–0.95)
FASTING DURATION TIME PATIENT: 0 HOURS (ref 0–999)
FASTING DURATION TIME PATIENT: 0 HOURS (ref 0–999)
GFR SERPLBLD BASED ON 1.73 SQ M-ARVRAT: 75 ML/MIN
GLOBULIN SER-MCNC: 2.9 G/DL (ref 2–4)
GLUCOSE SERPL-MCNC: 116 MG/DL (ref 70–99)
HDLC SERPL-MCNC: 59 MG/DL
LDLC SERPL CALC-MCNC: 131 MG/DL
NONHDLC SERPL-MCNC: 169 MG/DL
POTASSIUM SERPL-SCNC: 3.7 MMOL/L (ref 3.4–5.1)
PROT SERPL-MCNC: 6.1 G/DL (ref 6.4–8.2)
SODIUM SERPL-SCNC: 142 MMOL/L (ref 135–145)
TRIGL SERPL-MCNC: 191 MG/DL

## 2022-03-21 ENCOUNTER — OFFICE VISIT (OUTPATIENT)
Dept: URBAN - METROPOLITAN AREA CLINIC 21 | Facility: CLINIC | Age: 77
End: 2022-03-21
Payer: COMMERCIAL

## 2022-03-21 DIAGNOSIS — H43.391 OTHER VITREOUS OPACITIES, RIGHT EYE: ICD-10-CM

## 2022-03-21 DIAGNOSIS — H26.492 OTHER SECONDARY CATARACT, LEFT EYE: Primary | ICD-10-CM

## 2022-03-21 DIAGNOSIS — H35.372 PUCKERING OF MACULA, LEFT EYE: ICD-10-CM

## 2022-03-21 PROCEDURE — 92134 CPTRZ OPH DX IMG PST SGM RTA: CPT | Performed by: OPHTHALMOLOGY

## 2022-03-21 PROCEDURE — 99204 OFFICE O/P NEW MOD 45 MIN: CPT | Performed by: OPHTHALMOLOGY

## 2022-03-21 ASSESSMENT — INTRAOCULAR PRESSURE
OS: 18
OD: 16

## 2022-03-21 NOTE — IMPRESSION/PLAN
Impression: Other secondary cataract, left eye: H26.492. Plan: Advised patient of condition. YAG laser recommended for treatment to improve vision. Benefits and risks discussed with patient.  Pt to proceed with Yag laser, OS

## 2022-03-21 NOTE — IMPRESSION/PLAN
Impression: Puckering of macula, left eye: H35.372. Plan: Advised patient of condition. Mild monitor.

## 2022-03-25 ENCOUNTER — LASER (OUTPATIENT)
Dept: URBAN - METROPOLITAN AREA SURGERY 8 | Facility: SURGERY | Age: 77
End: 2022-03-25
Payer: COMMERCIAL

## 2022-03-25 PROCEDURE — 66821 AFTER CATARACT LASER SURGERY: CPT | Performed by: OPHTHALMOLOGY

## 2022-04-06 RX ORDER — OMEPRAZOLE 20 MG/1
20 CAPSULE, DELAYED RELEASE ORAL DAILY
Qty: 90 CAPSULE | Refills: 1 | Status: SHIPPED | OUTPATIENT
Start: 2022-04-06 | End: 2022-09-15 | Stop reason: SDUPTHER

## 2022-07-25 ENCOUNTER — OFFICE VISIT (OUTPATIENT)
Dept: FAMILY MEDICINE | Age: 77
End: 2022-07-25

## 2022-07-25 VITALS
BODY MASS INDEX: 33.77 KG/M2 | SYSTOLIC BLOOD PRESSURE: 120 MMHG | WEIGHT: 172 LBS | HEART RATE: 59 BPM | HEIGHT: 60 IN | DIASTOLIC BLOOD PRESSURE: 68 MMHG | RESPIRATION RATE: 18 BRPM | OXYGEN SATURATION: 97 %

## 2022-07-25 DIAGNOSIS — Z01.818 PREOPERATIVE EXAMINATION: Primary | ICD-10-CM

## 2022-07-25 PROCEDURE — 99213 OFFICE O/P EST LOW 20 MIN: CPT | Performed by: NURSE PRACTITIONER

## 2022-07-25 ASSESSMENT — PATIENT HEALTH QUESTIONNAIRE - PHQ9
1. LITTLE INTEREST OR PLEASURE IN DOING THINGS: NOT AT ALL
CLINICAL INTERPRETATION OF PHQ2 SCORE: NO FURTHER SCREENING NEEDED
SUM OF ALL RESPONSES TO PHQ9 QUESTIONS 1 AND 2: 0
SUM OF ALL RESPONSES TO PHQ9 QUESTIONS 1 AND 2: 0
2. FEELING DOWN, DEPRESSED OR HOPELESS: NOT AT ALL

## 2022-09-15 RX ORDER — OMEPRAZOLE 20 MG/1
20 CAPSULE, DELAYED RELEASE ORAL DAILY
Qty: 90 CAPSULE | Refills: 0 | Status: SHIPPED | OUTPATIENT
Start: 2022-09-15 | End: 2022-12-12

## 2022-11-21 ASSESSMENT — PATIENT HEALTH QUESTIONNAIRE - PHQ9
CLINICAL INTERPRETATION OF PHQ2 SCORE: NO FURTHER SCREENING NEEDED
1. LITTLE INTEREST OR PLEASURE IN DOING THINGS: NOT AT ALL
SUM OF ALL RESPONSES TO PHQ9 QUESTIONS 1 AND 2: 0
CLINICAL INTERPRETATION OF PHQ2 SCORE: 0
2. FEELING DOWN, DEPRESSED OR HOPELESS: NOT AT ALL

## 2022-11-28 ENCOUNTER — APPOINTMENT (OUTPATIENT)
Dept: FAMILY MEDICINE | Age: 77
End: 2022-11-28

## 2022-11-28 ENCOUNTER — OFFICE VISIT (OUTPATIENT)
Dept: FAMILY MEDICINE | Age: 77
End: 2022-11-28

## 2022-11-28 ENCOUNTER — LAB SERVICES (OUTPATIENT)
Dept: LAB | Age: 77
End: 2022-11-28

## 2022-11-28 VITALS
HEIGHT: 60 IN | RESPIRATION RATE: 16 BRPM | WEIGHT: 166 LBS | BODY MASS INDEX: 32.59 KG/M2 | SYSTOLIC BLOOD PRESSURE: 150 MMHG | DIASTOLIC BLOOD PRESSURE: 72 MMHG | HEART RATE: 65 BPM | OXYGEN SATURATION: 100 % | TEMPERATURE: 98.8 F

## 2022-11-28 DIAGNOSIS — I10 ESSENTIAL HYPERTENSION: ICD-10-CM

## 2022-11-28 DIAGNOSIS — Z85.038 HISTORY OF COLON CANCER: ICD-10-CM

## 2022-11-28 DIAGNOSIS — R30.0 DYSURIA: ICD-10-CM

## 2022-11-28 DIAGNOSIS — E78.49 OTHER HYPERLIPIDEMIA: ICD-10-CM

## 2022-11-28 DIAGNOSIS — Z00.00 MEDICARE ANNUAL WELLNESS VISIT, SUBSEQUENT: Primary | ICD-10-CM

## 2022-11-28 DIAGNOSIS — Z00.00 MEDICARE ANNUAL WELLNESS VISIT, SUBSEQUENT: ICD-10-CM

## 2022-11-28 DIAGNOSIS — K21.9 GASTROESOPHAGEAL REFLUX DISEASE, UNSPECIFIED WHETHER ESOPHAGITIS PRESENT: ICD-10-CM

## 2022-11-28 DIAGNOSIS — S46.811A STRAIN OF DELTOID MUSCLE, RIGHT, INITIAL ENCOUNTER: ICD-10-CM

## 2022-11-28 LAB
ALBUMIN SERPL-MCNC: 3.6 G/DL (ref 3.6–5.1)
ALBUMIN/GLOB SERPL: 1.4 {RATIO} (ref 1–2.4)
ALP SERPL-CCNC: 60 UNITS/L (ref 45–117)
ALT SERPL-CCNC: 19 UNITS/L
ANION GAP SERPL CALC-SCNC: 10 MMOL/L (ref 7–19)
APPEARANCE UR: ABNORMAL
AST SERPL-CCNC: 10 UNITS/L
BACTERIA #/AREA URNS HPF: ABNORMAL /HPF
BASOPHILS # BLD: 0.1 K/MCL (ref 0–0.3)
BASOPHILS NFR BLD: 1 %
BILIRUB SERPL-MCNC: 0.5 MG/DL (ref 0.2–1)
BILIRUB UR QL STRIP: NEGATIVE
BUN SERPL-MCNC: 17 MG/DL (ref 6–20)
BUN/CREAT SERPL: 24 (ref 7–25)
CALCIUM SERPL-MCNC: 9.3 MG/DL (ref 8.4–10.2)
CEA SERPL-MCNC: <2 NG/ML (ref 0–5)
CHLORIDE SERPL-SCNC: 102 MMOL/L (ref 97–110)
CHOLEST SERPL-MCNC: 243 MG/DL
CHOLEST/HDLC SERPL: 3.7 {RATIO}
CO2 SERPL-SCNC: 29 MMOL/L (ref 21–32)
COLOR UR: ABNORMAL
CREAT SERPL-MCNC: 0.7 MG/DL (ref 0.51–0.95)
DEPRECATED RDW RBC: 44.4 FL (ref 39–50)
EOSINOPHIL # BLD: 0.1 K/MCL (ref 0–0.5)
EOSINOPHIL NFR BLD: 1 %
ERYTHROCYTE [DISTWIDTH] IN BLOOD: 13.2 % (ref 11–15)
FASTING DURATION TIME PATIENT: 12 HOURS (ref 0–999)
FASTING DURATION TIME PATIENT: 12 HOURS (ref 0–999)
GFR SERPLBLD BASED ON 1.73 SQ M-ARVRAT: 90 ML/MIN
GLOBULIN SER-MCNC: 2.6 G/DL (ref 2–4)
GLUCOSE SERPL-MCNC: 100 MG/DL (ref 70–99)
GLUCOSE UR STRIP-MCNC: 100 MG/DL
HCT VFR BLD CALC: 41 % (ref 36–46.5)
HDLC SERPL-MCNC: 66 MG/DL
HGB BLD-MCNC: 13.1 G/DL (ref 12–15.5)
HGB UR QL STRIP: ABNORMAL
HYALINE CASTS #/AREA URNS LPF: ABNORMAL /LPF
IMM GRANULOCYTES # BLD AUTO: 0 K/MCL (ref 0–0.2)
IMM GRANULOCYTES # BLD: 0 %
KETONES UR STRIP-MCNC: NEGATIVE MG/DL
LDLC SERPL CALC-MCNC: 143 MG/DL
LEUKOCYTE ESTERASE UR QL STRIP: ABNORMAL
LYMPHOCYTES # BLD: 1.9 K/MCL (ref 1–4)
LYMPHOCYTES NFR BLD: 21 %
MCH RBC QN AUTO: 29.2 PG (ref 26–34)
MCHC RBC AUTO-ENTMCNC: 32 G/DL (ref 32–36.5)
MCV RBC AUTO: 91.5 FL (ref 78–100)
MONOCYTES # BLD: 0.4 K/MCL (ref 0.3–0.9)
MONOCYTES NFR BLD: 5 %
MUCOUS THREADS URNS QL MICRO: PRESENT
NEUTROPHILS # BLD: 6.3 K/MCL (ref 1.8–7.7)
NEUTROPHILS NFR BLD: 72 %
NITRITE UR QL STRIP: POSITIVE
NONHDLC SERPL-MCNC: 177 MG/DL
NRBC BLD MANUAL-RTO: 0 /100 WBC
PH UR STRIP: 5.5 [PH] (ref 5–7)
PLATELET # BLD AUTO: 324 K/MCL (ref 140–450)
POTASSIUM SERPL-SCNC: 3.4 MMOL/L (ref 3.4–5.1)
PROT SERPL-MCNC: 6.2 G/DL (ref 6.4–8.2)
PROT UR STRIP-MCNC: 30 MG/DL
RBC # BLD: 4.48 MIL/MCL (ref 4–5.2)
RBC #/AREA URNS HPF: ABNORMAL /HPF
SODIUM SERPL-SCNC: 138 MMOL/L (ref 135–145)
SP GR UR STRIP: 1.01 (ref 1–1.03)
SQUAMOUS #/AREA URNS HPF: ABNORMAL /HPF
TRIGL SERPL-MCNC: 170 MG/DL
TSH SERPL-ACNC: 0.64 MCUNITS/ML (ref 0.35–5)
UROBILINOGEN UR STRIP-MCNC: 1 MG/DL
WBC # BLD: 8.8 K/MCL (ref 4.2–11)
WBC #/AREA URNS HPF: >100 /HPF

## 2022-11-28 PROCEDURE — 87086 URINE CULTURE/COLONY COUNT: CPT | Performed by: CLINICAL MEDICAL LABORATORY

## 2022-11-28 PROCEDURE — 81001 URINALYSIS AUTO W/SCOPE: CPT | Performed by: CLINICAL MEDICAL LABORATORY

## 2022-11-28 PROCEDURE — 87186 SC STD MICRODIL/AGAR DIL: CPT | Performed by: CLINICAL MEDICAL LABORATORY

## 2022-11-28 PROCEDURE — 82378 CARCINOEMBRYONIC ANTIGEN: CPT | Performed by: CLINICAL MEDICAL LABORATORY

## 2022-11-28 PROCEDURE — 87077 CULTURE AEROBIC IDENTIFY: CPT | Performed by: CLINICAL MEDICAL LABORATORY

## 2022-11-28 PROCEDURE — 36415 COLL VENOUS BLD VENIPUNCTURE: CPT | Performed by: INTERNAL MEDICINE

## 2022-11-28 PROCEDURE — 80053 COMPREHEN METABOLIC PANEL: CPT | Performed by: CLINICAL MEDICAL LABORATORY

## 2022-11-28 PROCEDURE — 84443 ASSAY THYROID STIM HORMONE: CPT | Performed by: CLINICAL MEDICAL LABORATORY

## 2022-11-28 PROCEDURE — 85025 COMPLETE CBC W/AUTO DIFF WBC: CPT | Performed by: CLINICAL MEDICAL LABORATORY

## 2022-11-28 PROCEDURE — G0439 PPPS, SUBSEQ VISIT: HCPCS | Performed by: NURSE PRACTITIONER

## 2022-11-28 PROCEDURE — 80061 LIPID PANEL: CPT | Performed by: CLINICAL MEDICAL LABORATORY

## 2022-11-28 RX ORDER — BACLOFEN 5 MG/1
5 TABLET ORAL EVERY 8 HOURS PRN
Qty: 30 TABLET | Refills: 0 | Status: SHIPPED | OUTPATIENT
Start: 2022-11-28

## 2022-11-28 RX ORDER — NITROFURANTOIN 25; 75 MG/1; MG/1
100 CAPSULE ORAL 2 TIMES DAILY
Qty: 10 CAPSULE | Refills: 0 | Status: SHIPPED | OUTPATIENT
Start: 2022-11-28 | End: 2022-12-03

## 2022-11-28 ASSESSMENT — PATIENT HEALTH QUESTIONNAIRE - PHQ9
SUM OF ALL RESPONSES TO PHQ9 QUESTIONS 1 AND 2: 0
2. FEELING DOWN, DEPRESSED OR HOPELESS: NOT AT ALL
1. LITTLE INTEREST OR PLEASURE IN DOING THINGS: NOT AT ALL
CLINICAL INTERPRETATION OF PHQ2 SCORE: NO FURTHER SCREENING NEEDED
SUM OF ALL RESPONSES TO PHQ9 QUESTIONS 1 AND 2: 0

## 2022-11-29 ENCOUNTER — TELEPHONE (OUTPATIENT)
Dept: FAMILY MEDICINE | Age: 77
End: 2022-11-29

## 2022-11-29 DIAGNOSIS — Z86.59 HISTORY OF DEPRESSION: ICD-10-CM

## 2022-11-29 DIAGNOSIS — E78.5 HYPERLIPIDEMIA, UNSPECIFIED HYPERLIPIDEMIA TYPE: ICD-10-CM

## 2022-11-29 DIAGNOSIS — Z00.00 MEDICARE ANNUAL WELLNESS VISIT, SUBSEQUENT: ICD-10-CM

## 2022-11-29 DIAGNOSIS — I10 ESSENTIAL HYPERTENSION: Primary | ICD-10-CM

## 2022-11-29 DIAGNOSIS — Z85.038 HISTORY OF COLON CANCER: ICD-10-CM

## 2022-11-30 LAB — BACTERIA UR CULT: ABNORMAL

## 2022-12-01 ENCOUNTER — TELEPHONE (OUTPATIENT)
Dept: FAMILY MEDICINE | Age: 77
End: 2022-12-01

## 2022-12-01 DIAGNOSIS — I10 ESSENTIAL HYPERTENSION: Primary | ICD-10-CM

## 2022-12-01 DIAGNOSIS — N39.0 UTI (URINARY TRACT INFECTION), UNCOMPLICATED: Primary | ICD-10-CM

## 2022-12-01 RX ORDER — HYDROCHLOROTHIAZIDE 12.5 MG/1
12.5 CAPSULE, GELATIN COATED ORAL DAILY
Qty: 90 CAPSULE | Refills: 3 | Status: SHIPPED | OUTPATIENT
Start: 2022-12-01

## 2022-12-01 RX ORDER — CEPHALEXIN 500 MG/1
500 CAPSULE ORAL 4 TIMES DAILY
Qty: 20 CAPSULE | Refills: 0 | Status: SHIPPED | OUTPATIENT
Start: 2022-12-01 | End: 2022-12-06

## 2022-12-12 RX ORDER — OMEPRAZOLE 20 MG/1
20 CAPSULE, DELAYED RELEASE ORAL DAILY
Qty: 90 CAPSULE | Refills: 0 | Status: SHIPPED | OUTPATIENT
Start: 2022-12-12 | End: 2023-03-10 | Stop reason: SDUPTHER

## 2022-12-19 DIAGNOSIS — I10 ESSENTIAL HYPERTENSION: Primary | ICD-10-CM

## 2022-12-19 RX ORDER — LOSARTAN POTASSIUM 100 MG/1
100 TABLET ORAL DAILY
Qty: 90 TABLET | Refills: 3 | Status: SHIPPED | OUTPATIENT
Start: 2022-12-19

## 2023-03-07 ENCOUNTER — APPOINTMENT (OUTPATIENT)
Dept: FAMILY MEDICINE | Age: 78
End: 2023-03-07

## 2023-03-09 ENCOUNTER — TELEPHONE (OUTPATIENT)
Dept: FAMILY MEDICINE | Age: 78
End: 2023-03-09

## 2023-03-09 ENCOUNTER — OFFICE VISIT (OUTPATIENT)
Dept: FAMILY MEDICINE | Age: 78
End: 2023-03-09

## 2023-03-09 ENCOUNTER — IMAGING SERVICES (OUTPATIENT)
Dept: GENERAL RADIOLOGY | Age: 78
End: 2023-03-09
Attending: NURSE PRACTITIONER

## 2023-03-09 VITALS
OXYGEN SATURATION: 99 % | WEIGHT: 170 LBS | BODY MASS INDEX: 33.38 KG/M2 | RESPIRATION RATE: 16 BRPM | HEART RATE: 57 BPM | DIASTOLIC BLOOD PRESSURE: 72 MMHG | HEIGHT: 60 IN | TEMPERATURE: 99 F | SYSTOLIC BLOOD PRESSURE: 116 MMHG

## 2023-03-09 DIAGNOSIS — M79.601 RIGHT ARM PAIN: ICD-10-CM

## 2023-03-09 DIAGNOSIS — S46.811A STRAIN OF RIGHT DELTOID MUSCLE, INITIAL ENCOUNTER: Primary | ICD-10-CM

## 2023-03-09 PROCEDURE — 99213 OFFICE O/P EST LOW 20 MIN: CPT | Performed by: NURSE PRACTITIONER

## 2023-03-09 PROCEDURE — 73060 X-RAY EXAM OF HUMERUS: CPT | Performed by: RADIOLOGY

## 2023-03-09 RX ORDER — IBUPROFEN 200 MG
200 TABLET ORAL EVERY 8 HOURS PRN
COMMUNITY

## 2023-03-09 ASSESSMENT — ENCOUNTER SYMPTOMS
PSYCHIATRIC NEGATIVE: 1
RESPIRATORY NEGATIVE: 1
CONSTITUTIONAL NEGATIVE: 1
GASTROINTESTINAL NEGATIVE: 1
NEUROLOGICAL NEGATIVE: 1

## 2023-03-09 ASSESSMENT — PATIENT HEALTH QUESTIONNAIRE - PHQ9
1. LITTLE INTEREST OR PLEASURE IN DOING THINGS: NOT AT ALL
2. FEELING DOWN, DEPRESSED OR HOPELESS: NOT AT ALL
SUM OF ALL RESPONSES TO PHQ9 QUESTIONS 1 AND 2: 0
CLINICAL INTERPRETATION OF PHQ2 SCORE: NO FURTHER SCREENING NEEDED
SUM OF ALL RESPONSES TO PHQ9 QUESTIONS 1 AND 2: 0

## 2023-03-10 ENCOUNTER — TELEPHONE (OUTPATIENT)
Dept: FAMILY MEDICINE | Age: 78
End: 2023-03-10

## 2023-03-10 DIAGNOSIS — K21.9 GASTROESOPHAGEAL REFLUX DISEASE, UNSPECIFIED WHETHER ESOPHAGITIS PRESENT: Primary | ICD-10-CM

## 2023-03-10 RX ORDER — OMEPRAZOLE 20 MG/1
20 CAPSULE, DELAYED RELEASE ORAL DAILY
Qty: 90 CAPSULE | Refills: 3 | Status: SHIPPED | OUTPATIENT
Start: 2023-03-10

## 2023-03-13 ENCOUNTER — TELEPHONE (OUTPATIENT)
Dept: FAMILY MEDICINE | Age: 78
End: 2023-03-13

## 2023-03-13 DIAGNOSIS — M79.601 RIGHT ARM PAIN: Primary | ICD-10-CM

## 2023-08-22 ENCOUNTER — IMAGING SERVICES (OUTPATIENT)
Dept: GENERAL RADIOLOGY | Age: 78
End: 2023-08-22
Attending: NURSE PRACTITIONER

## 2023-08-22 ENCOUNTER — OFFICE VISIT (OUTPATIENT)
Dept: FAMILY MEDICINE | Age: 78
End: 2023-08-22

## 2023-08-22 VITALS
RESPIRATION RATE: 12 BRPM | HEART RATE: 54 BPM | SYSTOLIC BLOOD PRESSURE: 122 MMHG | OXYGEN SATURATION: 98 % | BODY MASS INDEX: 33.18 KG/M2 | WEIGHT: 169 LBS | TEMPERATURE: 98.7 F | HEIGHT: 60 IN | DIASTOLIC BLOOD PRESSURE: 60 MMHG

## 2023-08-22 DIAGNOSIS — M25.552 CHRONIC PAIN OF BOTH HIPS: ICD-10-CM

## 2023-08-22 DIAGNOSIS — G89.29 CHRONIC PAIN OF BOTH HIPS: ICD-10-CM

## 2023-08-22 DIAGNOSIS — L91.8 SKIN TAG: ICD-10-CM

## 2023-08-22 DIAGNOSIS — L98.9 SKIN LESION OF BACK: Primary | ICD-10-CM

## 2023-08-22 DIAGNOSIS — L82.1 SEBORRHEIC KERATOSIS: ICD-10-CM

## 2023-08-22 DIAGNOSIS — M25.551 CHRONIC PAIN OF BOTH HIPS: ICD-10-CM

## 2023-08-22 PROCEDURE — 11200 RMVL SKIN TAGS UP TO&INC 15: CPT | Performed by: NURSE PRACTITIONER

## 2023-08-22 PROCEDURE — 11301 SHAVE SKIN LESION 0.6-1.0 CM: CPT | Performed by: NURSE PRACTITIONER

## 2023-08-22 PROCEDURE — 73521 X-RAY EXAM HIPS BI 2 VIEWS: CPT | Performed by: RADIOLOGY

## 2023-08-22 PROCEDURE — 11311 SHAVE SKIN LESION 0.6-1.0 CM: CPT | Performed by: NURSE PRACTITIONER

## 2023-08-22 PROCEDURE — 99214 OFFICE O/P EST MOD 30 MIN: CPT | Performed by: NURSE PRACTITIONER

## 2023-08-22 PROCEDURE — 88305 TISSUE EXAM BY PATHOLOGIST: CPT | Performed by: CLINICAL MEDICAL LABORATORY

## 2023-08-22 ASSESSMENT — PATIENT HEALTH QUESTIONNAIRE - PHQ9
SUM OF ALL RESPONSES TO PHQ9 QUESTIONS 1 AND 2: 0
CLINICAL INTERPRETATION OF PHQ2 SCORE: NO FURTHER SCREENING NEEDED
1. LITTLE INTEREST OR PLEASURE IN DOING THINGS: NOT AT ALL
2. FEELING DOWN, DEPRESSED OR HOPELESS: NOT AT ALL
SUM OF ALL RESPONSES TO PHQ9 QUESTIONS 1 AND 2: 0

## 2023-08-25 ENCOUNTER — TELEPHONE (OUTPATIENT)
Dept: FAMILY MEDICINE | Age: 78
End: 2023-08-25

## 2023-08-25 LAB
ASR DISCLAIMER: NORMAL
CASE RPRT: NORMAL
CLINICAL INFO: NORMAL
PATH REPORT.FINAL DX SPEC: NORMAL
PATH REPORT.GROSS SPEC: NORMAL
PATH REPORT.MICROSCOPIC SPEC OTHER STN: NORMAL

## 2023-08-31 ENCOUNTER — TELEPHONE (OUTPATIENT)
Dept: FAMILY MEDICINE | Age: 78
End: 2023-08-31

## 2023-11-04 ENCOUNTER — E-ADVICE (OUTPATIENT)
Dept: OTHER | Age: 78
End: 2023-11-04

## 2023-11-19 DIAGNOSIS — I10 ESSENTIAL HYPERTENSION: ICD-10-CM

## 2023-11-20 RX ORDER — HYDROCHLOROTHIAZIDE 12.5 MG/1
12.5 CAPSULE, GELATIN COATED ORAL DAILY
Qty: 90 CAPSULE | Refills: 3 | Status: SHIPPED | OUTPATIENT
Start: 2023-11-20

## 2023-11-29 ENCOUNTER — APPOINTMENT (OUTPATIENT)
Dept: LAB | Age: 78
End: 2023-11-29

## 2023-11-29 DIAGNOSIS — Z85.038 HISTORY OF COLON CANCER: ICD-10-CM

## 2023-11-29 DIAGNOSIS — Z00.00 MEDICARE ANNUAL WELLNESS VISIT, SUBSEQUENT: ICD-10-CM

## 2023-11-29 DIAGNOSIS — Z86.59 HISTORY OF DEPRESSION: ICD-10-CM

## 2023-11-29 DIAGNOSIS — E78.5 HYPERLIPIDEMIA, UNSPECIFIED HYPERLIPIDEMIA TYPE: ICD-10-CM

## 2023-11-29 DIAGNOSIS — I10 ESSENTIAL HYPERTENSION: ICD-10-CM

## 2023-11-29 LAB
ALBUMIN SERPL-MCNC: 3.1 G/DL (ref 3.6–5.1)
ALBUMIN/GLOB SERPL: 1.1 {RATIO} (ref 1–2.4)
ALP SERPL-CCNC: 57 UNITS/L (ref 45–117)
ALT SERPL-CCNC: 21 UNITS/L
ANION GAP SERPL CALC-SCNC: 9 MMOL/L (ref 7–19)
AST SERPL-CCNC: 12 UNITS/L
BASOPHILS # BLD: 0 K/MCL (ref 0–0.3)
BASOPHILS NFR BLD: 1 %
BILIRUB SERPL-MCNC: 0.5 MG/DL (ref 0.2–1)
BUN SERPL-MCNC: 19 MG/DL (ref 6–20)
BUN/CREAT SERPL: 23 (ref 7–25)
CALCIUM SERPL-MCNC: 8.4 MG/DL (ref 8.4–10.2)
CEA SERPL-MCNC: <2 NG/ML (ref 0–5)
CHLORIDE SERPL-SCNC: 107 MMOL/L (ref 97–110)
CHOLEST SERPL-MCNC: 263 MG/DL
CHOLEST/HDLC SERPL: 4 {RATIO}
CO2 SERPL-SCNC: 29 MMOL/L (ref 21–32)
CREAT SERPL-MCNC: 0.83 MG/DL (ref 0.51–0.95)
DEPRECATED RDW RBC: 44.9 FL (ref 39–50)
EGFRCR SERPLBLD CKD-EPI 2021: 73 ML/MIN/{1.73_M2}
EOSINOPHIL # BLD: 0.1 K/MCL (ref 0–0.5)
EOSINOPHIL NFR BLD: 2 %
ERYTHROCYTE [DISTWIDTH] IN BLOOD: 13.3 % (ref 11–15)
FASTING DURATION TIME PATIENT: 12 HOURS (ref 0–999)
GLOBULIN SER-MCNC: 2.8 G/DL (ref 2–4)
GLUCOSE SERPL-MCNC: 95 MG/DL (ref 70–99)
HCT VFR BLD CALC: 40 % (ref 36–46.5)
HDLC SERPL-MCNC: 66 MG/DL
HGB BLD-MCNC: 12.7 G/DL (ref 12–15.5)
IMM GRANULOCYTES # BLD AUTO: 0 K/MCL (ref 0–0.2)
IMM GRANULOCYTES # BLD: 1 %
LDLC SERPL CALC-MCNC: 164 MG/DL
LYMPHOCYTES # BLD: 1.6 K/MCL (ref 1–4)
LYMPHOCYTES NFR BLD: 29 %
MCH RBC QN AUTO: 29.3 PG (ref 26–34)
MCHC RBC AUTO-ENTMCNC: 31.8 G/DL (ref 32–36.5)
MCV RBC AUTO: 92.2 FL (ref 78–100)
MONOCYTES # BLD: 0.3 K/MCL (ref 0.3–0.9)
MONOCYTES NFR BLD: 6 %
NEUTROPHILS # BLD: 3.4 K/MCL (ref 1.8–7.7)
NEUTROPHILS NFR BLD: 61 %
NONHDLC SERPL-MCNC: 197 MG/DL
NRBC BLD MANUAL-RTO: 0 /100 WBC
PLATELET # BLD AUTO: 270 K/MCL (ref 140–450)
POTASSIUM SERPL-SCNC: 3.6 MMOL/L (ref 3.4–5.1)
PROT SERPL-MCNC: 5.9 G/DL (ref 6.4–8.2)
RBC # BLD: 4.34 MIL/MCL (ref 4–5.2)
SODIUM SERPL-SCNC: 141 MMOL/L (ref 135–145)
TRIGL SERPL-MCNC: 167 MG/DL
TSH SERPL-ACNC: 0.78 MCUNITS/ML (ref 0.35–5)
WBC # BLD: 5.5 K/MCL (ref 4.2–11)

## 2023-11-29 PROCEDURE — 80061 LIPID PANEL: CPT | Performed by: CLINICAL MEDICAL LABORATORY

## 2023-11-29 PROCEDURE — 80053 COMPREHEN METABOLIC PANEL: CPT | Performed by: CLINICAL MEDICAL LABORATORY

## 2023-11-29 PROCEDURE — 84443 ASSAY THYROID STIM HORMONE: CPT | Performed by: CLINICAL MEDICAL LABORATORY

## 2023-11-29 PROCEDURE — 36415 COLL VENOUS BLD VENIPUNCTURE: CPT | Performed by: INTERNAL MEDICINE

## 2023-11-29 PROCEDURE — 85025 COMPLETE CBC W/AUTO DIFF WBC: CPT | Performed by: CLINICAL MEDICAL LABORATORY

## 2023-11-29 PROCEDURE — 82378 CARCINOEMBRYONIC ANTIGEN: CPT | Performed by: CLINICAL MEDICAL LABORATORY

## 2023-12-01 DIAGNOSIS — I10 ESSENTIAL HYPERTENSION: ICD-10-CM

## 2023-12-01 RX ORDER — LOSARTAN POTASSIUM 100 MG/1
100 TABLET ORAL DAILY
Qty: 90 TABLET | Refills: 1 | Status: SHIPPED | OUTPATIENT
Start: 2023-12-01

## 2023-12-04 ENCOUNTER — APPOINTMENT (OUTPATIENT)
Dept: FAMILY MEDICINE | Age: 78
End: 2023-12-04

## 2023-12-04 ENCOUNTER — TELEPHONE (OUTPATIENT)
Dept: GASTROENTEROLOGY | Age: 78
End: 2023-12-04

## 2023-12-04 VITALS
SYSTOLIC BLOOD PRESSURE: 120 MMHG | BODY MASS INDEX: 33.65 KG/M2 | HEART RATE: 70 BPM | DIASTOLIC BLOOD PRESSURE: 72 MMHG | OXYGEN SATURATION: 99 % | WEIGHT: 171.4 LBS | HEIGHT: 60 IN

## 2023-12-04 DIAGNOSIS — I10 ESSENTIAL HYPERTENSION: ICD-10-CM

## 2023-12-04 DIAGNOSIS — Z12.11 COLON CANCER SCREENING: ICD-10-CM

## 2023-12-04 DIAGNOSIS — Z93.3 COLOSTOMY IN PLACE (CMD): ICD-10-CM

## 2023-12-04 DIAGNOSIS — Z12.31 ENCOUNTER FOR SCREENING MAMMOGRAM FOR MALIGNANT NEOPLASM OF BREAST: ICD-10-CM

## 2023-12-04 DIAGNOSIS — E78.2 MIXED HYPERLIPIDEMIA: ICD-10-CM

## 2023-12-04 DIAGNOSIS — Z00.00 MEDICARE ANNUAL WELLNESS VISIT, SUBSEQUENT: Primary | ICD-10-CM

## 2023-12-04 DIAGNOSIS — K21.9 GASTROESOPHAGEAL REFLUX DISEASE, UNSPECIFIED WHETHER ESOPHAGITIS PRESENT: ICD-10-CM

## 2023-12-04 PROCEDURE — G0439 PPPS, SUBSEQ VISIT: HCPCS | Performed by: NURSE PRACTITIONER

## 2023-12-04 PROCEDURE — 99213 OFFICE O/P EST LOW 20 MIN: CPT | Performed by: NURSE PRACTITIONER

## 2023-12-04 ASSESSMENT — PATIENT HEALTH QUESTIONNAIRE - PHQ9
SUM OF ALL RESPONSES TO PHQ9 QUESTIONS 1 AND 2: 0
1. LITTLE INTEREST OR PLEASURE IN DOING THINGS: NOT AT ALL
SUM OF ALL RESPONSES TO PHQ9 QUESTIONS 1 AND 2: 0
2. FEELING DOWN, DEPRESSED OR HOPELESS: NOT AT ALL
CLINICAL INTERPRETATION OF PHQ2 SCORE: NO FURTHER SCREENING NEEDED

## 2024-02-29 DIAGNOSIS — K21.9 GASTROESOPHAGEAL REFLUX DISEASE, UNSPECIFIED WHETHER ESOPHAGITIS PRESENT: ICD-10-CM

## 2024-02-29 RX ORDER — OMEPRAZOLE 20 MG/1
20 CAPSULE, DELAYED RELEASE ORAL DAILY
Qty: 90 CAPSULE | Refills: 3 | Status: SHIPPED | OUTPATIENT
Start: 2024-02-29

## 2024-05-26 DIAGNOSIS — I10 ESSENTIAL HYPERTENSION: ICD-10-CM

## 2024-05-28 RX ORDER — LOSARTAN POTASSIUM 100 MG/1
100 TABLET ORAL DAILY
Qty: 90 TABLET | Refills: 1 | Status: SHIPPED | OUTPATIENT
Start: 2024-05-28

## 2024-06-05 ENCOUNTER — APPOINTMENT (OUTPATIENT)
Dept: FAMILY MEDICINE | Age: 79
End: 2024-06-05

## 2024-06-05 VITALS
TEMPERATURE: 98.8 F | HEIGHT: 60 IN | BODY MASS INDEX: 32.39 KG/M2 | DIASTOLIC BLOOD PRESSURE: 76 MMHG | WEIGHT: 165 LBS | HEART RATE: 58 BPM | OXYGEN SATURATION: 95 % | SYSTOLIC BLOOD PRESSURE: 124 MMHG

## 2024-06-05 DIAGNOSIS — Z85.038 HISTORY OF COLON CANCER: ICD-10-CM

## 2024-06-05 DIAGNOSIS — N94.9 VAGINAL BURNING: Primary | ICD-10-CM

## 2024-06-05 DIAGNOSIS — Z12.11 COLON CANCER SCREENING: ICD-10-CM

## 2024-06-05 DIAGNOSIS — K21.9 GASTROESOPHAGEAL REFLUX DISEASE, UNSPECIFIED WHETHER ESOPHAGITIS PRESENT: ICD-10-CM

## 2024-06-05 DIAGNOSIS — I10 ESSENTIAL HYPERTENSION: ICD-10-CM

## 2024-06-05 DIAGNOSIS — Z93.3 COLOSTOMY IN PLACE  (CMD): ICD-10-CM

## 2024-06-05 DIAGNOSIS — E78.2 MIXED HYPERLIPIDEMIA: ICD-10-CM

## 2024-06-05 PROCEDURE — 81513 NFCT DS BV RNA VAG FLU ALG: CPT | Performed by: CLINICAL MEDICAL LABORATORY

## 2024-06-05 PROCEDURE — 99214 OFFICE O/P EST MOD 30 MIN: CPT | Performed by: NURSE PRACTITIONER

## 2024-06-05 RX ORDER — FLUCONAZOLE 150 MG/1
TABLET ORAL
Qty: 1 TABLET | Refills: 0 | Status: SHIPPED | OUTPATIENT
Start: 2024-06-05

## 2024-06-05 ASSESSMENT — PATIENT HEALTH QUESTIONNAIRE - PHQ9
2. FEELING DOWN, DEPRESSED OR HOPELESS: NOT AT ALL
CLINICAL INTERPRETATION OF PHQ2 SCORE: NO FURTHER SCREENING NEEDED
SUM OF ALL RESPONSES TO PHQ9 QUESTIONS 1 AND 2: 0
1. LITTLE INTEREST OR PLEASURE IN DOING THINGS: NOT AT ALL
SUM OF ALL RESPONSES TO PHQ9 QUESTIONS 1 AND 2: 0

## 2024-06-06 ENCOUNTER — TELEPHONE (OUTPATIENT)
Dept: FAMILY MEDICINE | Age: 79
End: 2024-06-06

## 2024-06-06 LAB
BACTERIAL VAGINOSIS VAG-IMP: NOT DETECTED
C ALBICANS DNA VAG QL NAA+PROBE: NOT DETECTED
C GLABRATA DNA VAG QL NAA+PROBE: NOT DETECTED
SERVICE CMNT-IMP: NORMAL
SERVICE CMNT-IMP: NORMAL
T VAGINALIS DNA VAG QL NAA+PROBE: NOT DETECTED

## 2024-06-27 ENCOUNTER — TELEPHONE (OUTPATIENT)
Dept: GASTROENTEROLOGY | Age: 79
End: 2024-06-27

## 2024-06-27 RX ORDER — BISACODYL 5 MG/1
TABLET, DELAYED RELEASE ORAL
Qty: 3 TABLET | Refills: 0 | Status: ON HOLD | OUTPATIENT
Start: 2024-06-27 | End: 2024-08-22 | Stop reason: ALTCHOICE

## 2024-06-27 RX ORDER — POLYETHYLENE GLYCOL 3350 17 G/17G
POWDER, FOR SOLUTION ORAL
Qty: 510 G | Refills: 0 | Status: ON HOLD | OUTPATIENT
Start: 2024-06-27 | End: 2024-08-22 | Stop reason: ALTCHOICE

## 2024-08-15 SDOH — SOCIAL STABILITY: SOCIAL INSECURITY: HOW OFTEN DOES ANYONE, INCLUDING FAMILY AND FRIENDS, PHYSICALLY HURT YOU?: NEVER

## 2024-08-15 SDOH — SOCIAL STABILITY: SOCIAL INSECURITY: HOW OFTEN DOES ANYONE, INCLUDING FAMILY AND FRIENDS, SCREAM OR CURSE AT YOU?: NEVER

## 2024-08-15 SDOH — SOCIAL STABILITY: SOCIAL INSECURITY: HOW OFTEN DOES ANYONE, INCLUDING FAMILY AND FRIENDS, THREATEN YOU WITH HARM?: NEVER

## 2024-08-15 SDOH — SOCIAL STABILITY: SOCIAL INSECURITY: HOW OFTEN DOES ANYONE, INCLUDING FAMILY AND FRIENDS, INSULT OR TALK DOWN TO YOU?: NEVER

## 2024-08-15 ASSESSMENT — ACTIVITIES OF DAILY LIVING (ADL)
NEEDS_ASSIST: NO
HISTORY OF FALLING IN THE LAST YEAR (PRIOR TO ADMISSION): NO
ADL_BEFORE_ADMISSION: INDEPENDENT
SENSORY_SUPPORT_DEVICES: EYEGLASSES;DENTURES
RECENT_DECLINE_ADL: NO
CHRONIC_PAIN_PRESENT: NO
ADL_SHORT_OF_BREATH: NO
ADL_SCORE: 12

## 2024-08-22 ENCOUNTER — HOSPITAL ENCOUNTER (OUTPATIENT)
Age: 79
Discharge: HOME | End: 2024-08-22
Attending: INTERNAL MEDICINE | Admitting: INTERNAL MEDICINE

## 2024-08-22 ENCOUNTER — TELEPHONE (OUTPATIENT)
Dept: FAMILY MEDICINE | Age: 79
End: 2024-08-22

## 2024-08-22 ENCOUNTER — ANESTHESIA (OUTPATIENT)
Dept: SURGERY | Age: 79
End: 2024-08-22

## 2024-08-22 ENCOUNTER — ANESTHESIA EVENT (OUTPATIENT)
Dept: SURGERY | Age: 79
End: 2024-08-22

## 2024-08-22 ENCOUNTER — PREP FOR CASE (OUTPATIENT)
Dept: GASTROENTEROLOGY | Age: 79
End: 2024-08-22

## 2024-08-22 VITALS
DIASTOLIC BLOOD PRESSURE: 68 MMHG | OXYGEN SATURATION: 100 % | HEART RATE: 60 BPM | RESPIRATION RATE: 15 BRPM | WEIGHT: 160 LBS | SYSTOLIC BLOOD PRESSURE: 164 MMHG | TEMPERATURE: 97.2 F | HEIGHT: 60 IN | BODY MASS INDEX: 31.41 KG/M2

## 2024-08-22 DIAGNOSIS — Z85.038 HISTORY OF COLON CANCER: ICD-10-CM

## 2024-08-22 DIAGNOSIS — Z85.038 HISTORY OF COLON CANCER: Primary | ICD-10-CM

## 2024-08-22 PROCEDURE — 10002801 HB RX 250 W/O HCPCS: Performed by: INTERNAL MEDICINE

## 2024-08-22 PROCEDURE — 10002800 HB RX 250 W HCPCS: Performed by: NURSE ANESTHETIST, CERTIFIED REGISTERED

## 2024-08-22 PROCEDURE — 44389 COLONOSCOPY WITH BIOPSY: CPT | Performed by: INTERNAL MEDICINE

## 2024-08-22 PROCEDURE — 10002362 HB ANESTH MAC IV 1ST 1/2 HR: Performed by: INTERNAL MEDICINE

## 2024-08-22 PROCEDURE — 44394 COLONOSCOPY W/SNARE: CPT | Performed by: INTERNAL MEDICINE

## 2024-08-22 PROCEDURE — 88305 TISSUE EXAM BY PATHOLOGIST: CPT | Performed by: INTERNAL MEDICINE

## 2024-08-22 PROCEDURE — 10002807 HB RX 258: Performed by: NURSE ANESTHETIST, CERTIFIED REGISTERED

## 2024-08-22 PROCEDURE — 10003428 HB COLONOSCOPY: Performed by: INTERNAL MEDICINE

## 2024-08-22 RX ORDER — NICOTINE POLACRILEX 4 MG
30 LOZENGE BUCCAL
Status: DISCONTINUED | OUTPATIENT
Start: 2024-08-22 | End: 2024-08-22 | Stop reason: HOSPADM

## 2024-08-22 RX ORDER — SODIUM CHLORIDE 9 MG/ML
INJECTION, SOLUTION INTRAVENOUS CONTINUOUS
Status: DISCONTINUED | OUTPATIENT
Start: 2024-08-22 | End: 2024-08-22 | Stop reason: HOSPADM

## 2024-08-22 RX ORDER — SODIUM CHLORIDE, SODIUM LACTATE, POTASSIUM CHLORIDE, CALCIUM CHLORIDE 600; 310; 30; 20 MG/100ML; MG/100ML; MG/100ML; MG/100ML
INJECTION, SOLUTION INTRAVENOUS CONTINUOUS
Status: DISCONTINUED | OUTPATIENT
Start: 2024-08-22 | End: 2024-08-22 | Stop reason: HOSPADM

## 2024-08-22 RX ORDER — PROPOFOL 10 MG/ML
INJECTION, EMULSION INTRAVENOUS PRN
Status: DISCONTINUED | OUTPATIENT
Start: 2024-08-22 | End: 2024-08-22

## 2024-08-22 RX ORDER — 0.9 % SODIUM CHLORIDE 0.9 %
2 VIAL (ML) INJECTION EVERY 12 HOURS SCHEDULED
Status: DISCONTINUED | OUTPATIENT
Start: 2024-08-22 | End: 2024-08-22 | Stop reason: HOSPADM

## 2024-08-22 RX ORDER — DEXTROSE MONOHYDRATE 25 G/50ML
25 INJECTION, SOLUTION INTRAVENOUS PRN
Status: DISCONTINUED | OUTPATIENT
Start: 2024-08-22 | End: 2024-08-22 | Stop reason: HOSPADM

## 2024-08-22 RX ADMIN — PROPOFOL 130 MCG/KG/MIN: 10 INJECTION, EMULSION INTRAVENOUS at 10:04

## 2024-08-22 RX ADMIN — PROPOFOL 100 MG: 10 INJECTION, EMULSION INTRAVENOUS at 10:04

## 2024-08-22 RX ADMIN — SODIUM CHLORIDE, POTASSIUM CHLORIDE, SODIUM LACTATE AND CALCIUM CHLORIDE: 600; 310; 30; 20 INJECTION, SOLUTION INTRAVENOUS at 09:16

## 2024-08-22 ASSESSMENT — PAIN SCALES - GENERAL
PAINLEVEL_OUTOF10: 0

## 2024-08-28 ENCOUNTER — TELEPHONE (OUTPATIENT)
Dept: FAMILY MEDICINE | Age: 79
End: 2024-08-28

## 2024-08-28 ENCOUNTER — TELEPHONE (OUTPATIENT)
Dept: GASTROENTEROLOGY | Age: 79
End: 2024-08-28

## 2024-11-23 DIAGNOSIS — I10 ESSENTIAL HYPERTENSION: ICD-10-CM

## 2024-11-25 RX ORDER — LOSARTAN POTASSIUM 100 MG/1
100 TABLET ORAL DAILY
Qty: 90 TABLET | Refills: 0 | Status: SHIPPED | OUTPATIENT
Start: 2024-11-25

## 2024-11-30 DIAGNOSIS — I10 ESSENTIAL HYPERTENSION: ICD-10-CM

## 2024-12-02 RX ORDER — HYDROCHLOROTHIAZIDE 12.5 MG/1
12.5 CAPSULE ORAL DAILY
Qty: 90 CAPSULE | Refills: 3 | Status: SHIPPED | OUTPATIENT
Start: 2024-12-02

## 2024-12-04 ENCOUNTER — APPOINTMENT (OUTPATIENT)
Dept: FAMILY MEDICINE | Age: 79
End: 2024-12-04

## 2024-12-04 ENCOUNTER — LAB SERVICES (OUTPATIENT)
Dept: LAB | Age: 79
End: 2024-12-04

## 2024-12-04 VITALS
SYSTOLIC BLOOD PRESSURE: 126 MMHG | HEIGHT: 60 IN | HEART RATE: 54 BPM | DIASTOLIC BLOOD PRESSURE: 68 MMHG | TEMPERATURE: 97.9 F | BODY MASS INDEX: 33.29 KG/M2 | WEIGHT: 169.56 LBS | OXYGEN SATURATION: 97 %

## 2024-12-04 DIAGNOSIS — I10 ESSENTIAL HYPERTENSION: ICD-10-CM

## 2024-12-04 DIAGNOSIS — Z00.00 MEDICARE ANNUAL WELLNESS VISIT, SUBSEQUENT: Primary | ICD-10-CM

## 2024-12-04 DIAGNOSIS — Z93.3 COLOSTOMY IN PLACE  (CMD): ICD-10-CM

## 2024-12-04 DIAGNOSIS — L92.9 HYPERGRANULATION: ICD-10-CM

## 2024-12-04 DIAGNOSIS — K21.9 GASTROESOPHAGEAL REFLUX DISEASE, UNSPECIFIED WHETHER ESOPHAGITIS PRESENT: ICD-10-CM

## 2024-12-04 LAB
BASOPHILS # BLD: 0 K/MCL (ref 0–0.3)
BASOPHILS NFR BLD: 1 %
DEPRECATED RDW RBC: 45.2 FL (ref 39–50)
EOSINOPHIL # BLD: 0.1 K/MCL (ref 0–0.5)
EOSINOPHIL NFR BLD: 2 %
ERYTHROCYTE [DISTWIDTH] IN BLOOD: 13.7 % (ref 11–15)
HCT VFR BLD CALC: 41.9 % (ref 36–46.5)
HGB BLD-MCNC: 13.3 G/DL (ref 12–15.5)
IMM GRANULOCYTES # BLD AUTO: 0 K/MCL (ref 0–0.2)
IMM GRANULOCYTES # BLD: 1 %
LYMPHOCYTES # BLD: 1.6 K/MCL (ref 1–4)
LYMPHOCYTES NFR BLD: 25 %
MCH RBC QN AUTO: 28.7 PG (ref 26–34)
MCHC RBC AUTO-ENTMCNC: 31.7 G/DL (ref 32–36.5)
MCV RBC AUTO: 90.5 FL (ref 78–100)
MONOCYTES # BLD: 0.3 K/MCL (ref 0.3–0.9)
MONOCYTES NFR BLD: 6 %
NEUTROPHILS # BLD: 4.1 K/MCL (ref 1.8–7.7)
NEUTROPHILS NFR BLD: 65 %
NRBC BLD MANUAL-RTO: 0 /100 WBC
PLATELET # BLD AUTO: 295 K/MCL (ref 140–450)
RBC # BLD: 4.63 MIL/MCL (ref 4–5.2)
WBC # BLD: 6.2 K/MCL (ref 4.2–11)

## 2024-12-04 PROCEDURE — 84443 ASSAY THYROID STIM HORMONE: CPT | Performed by: CLINICAL MEDICAL LABORATORY

## 2024-12-04 PROCEDURE — 36415 COLL VENOUS BLD VENIPUNCTURE: CPT | Performed by: INTERNAL MEDICINE

## 2024-12-04 PROCEDURE — 80053 COMPREHEN METABOLIC PANEL: CPT | Performed by: CLINICAL MEDICAL LABORATORY

## 2024-12-04 PROCEDURE — G0439 PPPS, SUBSEQ VISIT: HCPCS | Performed by: NURSE PRACTITIONER

## 2024-12-04 PROCEDURE — 99214 OFFICE O/P EST MOD 30 MIN: CPT | Performed by: NURSE PRACTITIONER

## 2024-12-04 PROCEDURE — 85025 COMPLETE CBC W/AUTO DIFF WBC: CPT | Performed by: CLINICAL MEDICAL LABORATORY

## 2024-12-04 PROCEDURE — 80061 LIPID PANEL: CPT | Performed by: CLINICAL MEDICAL LABORATORY

## 2024-12-04 ASSESSMENT — PATIENT HEALTH QUESTIONNAIRE - PHQ9
SUM OF ALL RESPONSES TO PHQ9 QUESTIONS 1 AND 2: 0
SUM OF ALL RESPONSES TO PHQ9 QUESTIONS 1 AND 2: 0
2. FEELING DOWN, DEPRESSED OR HOPELESS: NOT AT ALL
CLINICAL INTERPRETATION OF PHQ2 SCORE: NO FURTHER SCREENING NEEDED
1. LITTLE INTEREST OR PLEASURE IN DOING THINGS: NOT AT ALL

## 2024-12-05 ENCOUNTER — TELEPHONE (OUTPATIENT)
Dept: FAMILY MEDICINE | Age: 79
End: 2024-12-05

## 2024-12-05 LAB
ALBUMIN SERPL-MCNC: 3.3 G/DL (ref 3.4–5)
ALBUMIN/GLOB SERPL: 1.1 {RATIO} (ref 1–2.4)
ALP SERPL-CCNC: 70 UNITS/L (ref 45–117)
ALT SERPL-CCNC: 20 UNITS/L
ANION GAP SERPL CALC-SCNC: 8 MMOL/L (ref 7–19)
AST SERPL-CCNC: 13 UNITS/L
BILIRUB SERPL-MCNC: 0.5 MG/DL (ref 0.2–1)
BUN SERPL-MCNC: 16 MG/DL (ref 6–20)
BUN/CREAT SERPL: 23 (ref 7–25)
CALCIUM SERPL-MCNC: 8.8 MG/DL (ref 8.4–10.2)
CHLORIDE SERPL-SCNC: 107 MMOL/L (ref 97–110)
CHOLEST SERPL-MCNC: 250 MG/DL
CHOLEST/HDLC SERPL: 3.2 {RATIO}
CO2 SERPL-SCNC: 28 MMOL/L (ref 21–32)
CREAT SERPL-MCNC: 0.7 MG/DL (ref 0.51–0.95)
EGFRCR SERPLBLD CKD-EPI 2021: 89 ML/MIN/{1.73_M2}
FASTING DURATION TIME PATIENT: 12 HOURS (ref 0–999)
GLOBULIN SER-MCNC: 2.9 G/DL (ref 2–4)
GLUCOSE SERPL-MCNC: 98 MG/DL (ref 70–99)
HDLC SERPL-MCNC: 78 MG/DL
LDLC SERPL CALC-MCNC: 141 MG/DL
NONHDLC SERPL-MCNC: 172 MG/DL
POTASSIUM SERPL-SCNC: 3.8 MMOL/L (ref 3.4–5.1)
PROT SERPL-MCNC: 6.2 G/DL (ref 6.4–8.2)
SODIUM SERPL-SCNC: 139 MMOL/L (ref 135–145)
TRIGL SERPL-MCNC: 154 MG/DL
TSH SERPL-ACNC: 0.79 MCUNITS/ML (ref 0.35–5)

## 2025-02-26 DIAGNOSIS — I10 ESSENTIAL HYPERTENSION: ICD-10-CM

## 2025-02-26 DIAGNOSIS — K21.9 GASTROESOPHAGEAL REFLUX DISEASE, UNSPECIFIED WHETHER ESOPHAGITIS PRESENT: ICD-10-CM

## 2025-02-26 RX ORDER — OMEPRAZOLE 20 MG/1
20 CAPSULE, DELAYED RELEASE ORAL DAILY
Qty: 90 CAPSULE | Refills: 3 | Status: SHIPPED | OUTPATIENT
Start: 2025-02-26

## 2025-02-26 RX ORDER — LOSARTAN POTASSIUM 100 MG/1
100 TABLET ORAL DAILY
Qty: 90 TABLET | Refills: 0 | Status: SHIPPED | OUTPATIENT
Start: 2025-02-26

## 2025-05-28 DIAGNOSIS — I10 ESSENTIAL HYPERTENSION: ICD-10-CM

## 2025-05-28 RX ORDER — LOSARTAN POTASSIUM 100 MG/1
100 TABLET ORAL DAILY
Qty: 90 TABLET | Refills: 0 | Status: SHIPPED | OUTPATIENT
Start: 2025-05-28

## 2025-06-04 ENCOUNTER — APPOINTMENT (OUTPATIENT)
Dept: FAMILY MEDICINE | Age: 80
End: 2025-06-04

## 2025-06-04 VITALS
DIASTOLIC BLOOD PRESSURE: 68 MMHG | HEIGHT: 60 IN | OXYGEN SATURATION: 96 % | BODY MASS INDEX: 33.99 KG/M2 | WEIGHT: 173.13 LBS | HEART RATE: 64 BPM | SYSTOLIC BLOOD PRESSURE: 136 MMHG | TEMPERATURE: 97.8 F

## 2025-06-04 DIAGNOSIS — K21.9 GASTROESOPHAGEAL REFLUX DISEASE, UNSPECIFIED WHETHER ESOPHAGITIS PRESENT: ICD-10-CM

## 2025-06-04 DIAGNOSIS — Z93.3 COLOSTOMY IN PLACE  (CMD): ICD-10-CM

## 2025-06-04 DIAGNOSIS — I10 ESSENTIAL HYPERTENSION: Primary | ICD-10-CM

## 2025-06-04 PROCEDURE — 99213 OFFICE O/P EST LOW 20 MIN: CPT | Performed by: NURSE PRACTITIONER

## 2025-06-04 ASSESSMENT — PATIENT HEALTH QUESTIONNAIRE - PHQ9
SUM OF ALL RESPONSES TO PHQ9 QUESTIONS 1 AND 2: 0
2. FEELING DOWN, DEPRESSED OR HOPELESS: NOT AT ALL
SUM OF ALL RESPONSES TO PHQ9 QUESTIONS 1 AND 2: 0
CLINICAL INTERPRETATION OF PHQ2 SCORE: NO FURTHER SCREENING NEEDED
1. LITTLE INTEREST OR PLEASURE IN DOING THINGS: NOT AT ALL

## 2025-08-07 ENCOUNTER — TELEPHONE (OUTPATIENT)
Dept: FAMILY MEDICINE | Age: 80
End: 2025-08-07

## 2025-08-28 DIAGNOSIS — I10 ESSENTIAL HYPERTENSION: ICD-10-CM

## 2025-08-28 RX ORDER — LOSARTAN POTASSIUM 100 MG/1
100 TABLET ORAL DAILY
Qty: 90 TABLET | Refills: 0 | Status: SHIPPED | OUTPATIENT
Start: 2025-08-28

## 2025-12-05 ENCOUNTER — APPOINTMENT (OUTPATIENT)
Dept: FAMILY MEDICINE | Age: 80
End: 2025-12-05

## (undated) DEVICE — MMIS - TUBING SCT CLR 6FT 3/16IN MDVC NCDTV MALE TO MALE

## (undated) DEVICE — MMIS - SYRINGE 50ML GRAD N-PYRG DEHP-FR PVC FREE STRL MED

## (undated) DEVICE — MMIS - CONNECTOR IRR ERBEFLO CLEVERCAP ESCP PORT OLYMPUS

## (undated) DEVICE — CAP ESCPSL LIGHT GRN 13.4MM 10.9-11.8MM 4MM DIST ATCH SH RVL

## (undated) DEVICE — MMIS - GLOVE EXAM MED PF XTD CUFF NTR

## (undated) DEVICE — FORCEPS BIOPSY CAPTURA PRO SPK

## (undated) DEVICE — MMIS - TUBING IRR ERBEFLOW SET CAP 24 HR ESCP PUMP DISP

## (undated) DEVICE — MMIS - SPONGE GAUZE 4X4IN RAYON POLY 4 PLY HI ABS

## (undated) DEVICE — MMIS - LINER SCT 1000CC CRD MDVC LOCK LID SHTOF VLV CNSTR

## (undated) DEVICE — SYRINGE 30ML SLIP TIP GRAD N-PYRG DEHP-FR STRL MED LF DISP

## (undated) DEVICE — VIA GI PROCEDURE KIT -CUSTOM

## (undated) DEVICE — MMIS - JELLY LUB 2OZ SRGLB BCTRST H2O SOL SCR CAP TUBE

## (undated) DEVICE — Device

## (undated) DEVICE — TRAP SPEC REM CLR SCT POLYP TRAP PLASTIC 4 CHMBR LF

## (undated) DEVICE — SNARE 9MM 230CM 2.4MM EXACTO COLD BRAID WIRE CLN CUT ENDO

## (undated) DEVICE — MMIS - CANNULA NSL 14FT OTE CSHN CRV TIP CRSH RES LUM TBG

## (undated) DEVICE — MMIS - SNARE MINI OVAL 5.5CM 1.5CM 7FR ACSNR 1 PC SOFT

## (undated) DEVICE — MMIS - CLIP INTERNAL 16MM 230CM GI STRL DISP INSTINCT